# Patient Record
Sex: FEMALE | Race: WHITE | NOT HISPANIC OR LATINO | Employment: OTHER | URBAN - NONMETROPOLITAN AREA
[De-identification: names, ages, dates, MRNs, and addresses within clinical notes are randomized per-mention and may not be internally consistent; named-entity substitution may affect disease eponyms.]

---

## 2023-05-22 ENCOUNTER — APPOINTMENT (OUTPATIENT)
Dept: PRIMARY CARE | Facility: CLINIC | Age: 68
End: 2023-05-22
Payer: MEDICARE

## 2023-05-30 PROBLEM — R30.0 DYSURIA: Status: ACTIVE | Noted: 2023-05-30

## 2023-05-30 PROBLEM — L98.9 SKIN LESION: Status: RESOLVED | Noted: 2023-05-30 | Resolved: 2023-05-30

## 2023-05-30 PROBLEM — G47.00 INSOMNIA: Status: ACTIVE | Noted: 2023-05-30

## 2023-05-30 PROBLEM — B37.31 YEAST INFECTION OF THE VAGINA: Status: ACTIVE | Noted: 2023-05-30

## 2023-05-30 PROBLEM — E78.5 DYSLIPIDEMIA: Status: ACTIVE | Noted: 2023-05-30

## 2023-05-30 PROBLEM — M25.561 RIGHT KNEE PAIN: Status: RESOLVED | Noted: 2023-05-30 | Resolved: 2023-05-30

## 2023-05-30 PROBLEM — R32 INCONTINENCE IN FEMALE: Status: ACTIVE | Noted: 2023-05-30

## 2023-05-30 PROBLEM — R23.2 HOT FLASHES: Status: ACTIVE | Noted: 2023-05-30

## 2023-05-30 PROBLEM — N95.1 POSTMENOPAUSAL DISORDER: Status: ACTIVE | Noted: 2023-05-30

## 2023-05-30 PROBLEM — E03.9 HYPOTHYROIDISM: Status: ACTIVE | Noted: 2023-05-30

## 2023-05-30 PROBLEM — E55.9 VITAMIN D DEFICIENCY: Status: ACTIVE | Noted: 2023-05-30

## 2023-05-30 RX ORDER — IBUPROFEN 800 MG/1
1 TABLET ORAL 3 TIMES DAILY PRN
COMMUNITY
Start: 2022-10-12 | End: 2023-07-27 | Stop reason: SDUPTHER

## 2023-05-30 NOTE — PROGRESS NOTES
Subjective   Patient ID: Lauren Terry is a 68 y.o. female who presents for Follow-up (Pt presents today for a 6 month follow up; both knee pain, pain management shots back in Dec of 2022; would like a referral for GI, no control of bladder/bowls; ).    HPI   I have personally reviewed the OARRS report for LAUREN TERRY. I have considered the risks of abuse, dependence, addiction and diversion.   68yo female here for followup:     Right knee pain: Original injury in [place] 10y ago. Hurt it again about 6 weeks ago, no specific injury. Never had surgery, only had conservative therapy back when she was treated a decade ago. She went to PT at [facility] with no improvement. 6 weeks. She is using bengay, icy hot, and nsaids. She just retired and moved here 2y ago, she has a lot of projects that she wants to do this summer. Grew up in [place].   She followed up with pain management. Got injections in [place]. In December. Pain is happening again. She is interested in Ortho for meniscus repair.      Incontinence: She has had issues with urinary and bowel incontinence. She recently had C-scope with Dr. Coughlin. She has had cystocele repair x3, the first was after a 10lb baby (age 24), then another 12y later and then the 3rd 12y after that, most recent was in 2008.   Interested in surgery again  ANP  Gallbladder removed in 2015/2016, started loosing control after this. Taking probiotics, tried elimination diet. Happens 2-3 times per month.      Feet tingling, numbness: Going on for 6 or 8 months. Mostly on the soles of her feet. No pain. She does have a sister with neuropathy.      Hypothyroidism: off meds for about 4 months, completed labs last month.      Insomnia: She has melatonin, she also has ambien that she takes occasionally. A lot of her sleep issues are related to her urinary issues.      All other systems reviewed and negative for complaint unless stated above.     Review of Systems   Constitutional:  Negative for  "chills and fever.   HENT:  Negative for congestion, ear pain and rhinorrhea.    Eyes:  Negative for discharge and redness.   Respiratory:  Negative for cough, shortness of breath and wheezing.    Cardiovascular:  Negative for chest pain and leg swelling.   Gastrointestinal:  Negative for abdominal pain, constipation, diarrhea, nausea and vomiting.   Genitourinary:  Negative for difficulty urinating, frequency and urgency.   Musculoskeletal:  Negative for gait problem.   Skin:  Negative for rash and wound.   Neurological:  Negative for dizziness, weakness and headaches.   Psychiatric/Behavioral:  Negative for confusion. The patient is not nervous/anxious.        Objective   /81 (BP Location: Right arm, Patient Position: Sitting, BP Cuff Size: Large adult)   Pulse 76   Ht 1.6 m (5' 3\")   Wt 84.3 kg (185 lb 12.8 oz)   SpO2 97%   BMI 32.91 kg/m²     Physical Exam  Vitals reviewed.   Constitutional:       Appearance: Normal appearance.   HENT:      Head: Normocephalic.      Right Ear: Tympanic membrane, ear canal and external ear normal.      Left Ear: Tympanic membrane, ear canal and external ear normal.      Nose: No rhinorrhea.      Mouth/Throat:      Mouth: Mucous membranes are moist.      Pharynx: Oropharynx is clear.   Eyes:      Pupils: Pupils are equal, round, and reactive to light.   Cardiovascular:      Rate and Rhythm: Normal rate and regular rhythm.      Pulses: Normal pulses.   Pulmonary:      Effort: Pulmonary effort is normal.      Breath sounds: Normal breath sounds.   Abdominal:      General: Abdomen is flat. Bowel sounds are normal.      Palpations: Abdomen is soft.   Musculoskeletal:         General: No tenderness. Normal range of motion.      Right lower leg: No edema.      Left lower leg: No edema.   Lymphadenopathy:      Cervical: No cervical adenopathy.   Skin:     General: Skin is warm and dry.      Findings: No rash.   Neurological:      Mental Status: She is alert and oriented to " person, place, and time.   Psychiatric:         Mood and Affect: Mood normal.         Behavior: Behavior normal.         Assessment/Plan     #Knee pain   MRI shows large meniscus repair  we discussed that an injection would probably be helpful  referring to ortho today     #Incontinence  discussed PT but she wants to see a surgeon  referring today     #Hot flashes  on premarin, refilling today     #Dyslipidemia   Diet controlled     #Hypothyroidism  doing well off of meds     #Insomnia   has ambien  CSA signed today  mostly sx are related to sleep     #Vitamin D   checking levels today      HCM:  UTD for flu and PNA vaccines  C-scope 2022 with Madyson, next due 2032  Mammogram August 2021 WNL

## 2023-06-01 ENCOUNTER — OFFICE VISIT (OUTPATIENT)
Dept: PRIMARY CARE | Facility: CLINIC | Age: 68
End: 2023-06-01
Payer: MEDICARE

## 2023-06-01 VITALS
HEIGHT: 63 IN | HEART RATE: 76 BPM | OXYGEN SATURATION: 97 % | BODY MASS INDEX: 32.92 KG/M2 | WEIGHT: 185.8 LBS | SYSTOLIC BLOOD PRESSURE: 147 MMHG | DIASTOLIC BLOOD PRESSURE: 81 MMHG

## 2023-06-01 DIAGNOSIS — R15.9 INCONTINENCE OF FECES WITH FECAL URGENCY: ICD-10-CM

## 2023-06-01 DIAGNOSIS — M25.562 CHRONIC PAIN OF BOTH KNEES: Primary | ICD-10-CM

## 2023-06-01 DIAGNOSIS — B49 FUNGAL INFECTION: ICD-10-CM
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
## 2023-06-01 DIAGNOSIS — R32 INCONTINENCE IN FEMALE: ICD-10-CM

## 2023-06-01 DIAGNOSIS — G89.29 CHRONIC PAIN OF BOTH KNEES: Primary | ICD-10-CM

## 2023-06-01 DIAGNOSIS — M25.561 CHRONIC PAIN OF BOTH KNEES: Primary | ICD-10-CM

## 2023-06-01 DIAGNOSIS — Z87.828 HISTORY OF MENISCAL TEAR: ICD-10-CM

## 2023-06-01 DIAGNOSIS — R15.2 INCONTINENCE OF FECES WITH FECAL URGENCY: ICD-10-CM

## 2023-06-01 PROCEDURE — 1036F TOBACCO NON-USER: CPT | Performed by: REGISTERED NURSE

## 2023-06-01 PROCEDURE — 1159F MED LIST DOCD IN RCRD: CPT | Performed by: REGISTERED NURSE

## 2023-06-01 PROCEDURE — 1160F RVW MEDS BY RX/DR IN RCRD: CPT | Performed by: REGISTERED NURSE

## 2023-06-01 PROCEDURE — 99214 OFFICE O/P EST MOD 30 MIN: CPT | Performed by: REGISTERED NURSE

## 2023-06-01 RX ORDER — HYDROCORTISONE 25 MG/G
CREAM TOPICAL 2 TIMES DAILY PRN
Qty: 30 G | Refills: 2 | Status: SHIPPED | OUTPATIENT
Start: 2023-06-01 | End: 2023-11-20 | Stop reason: ALTCHOICE

## 2023-06-01 RX ORDER — ESTROGENS, CONJUGATED 0.45 MG/1
0.45 TABLET, FILM COATED ORAL DAILY
COMMUNITY
Start: 2023-03-16 | End: 2023-10-31 | Stop reason: SDUPTHER

## 2023-06-01 RX ORDER — HYDROXYCHLOROQUINE SULFATE 200 MG/1
TABLET, FILM COATED ORAL
COMMUNITY
Start: 2023-04-25 | End: 2023-06-01 | Stop reason: WASHOUT

## 2023-06-01 RX ORDER — ZOLPIDEM TARTRATE 5 MG/1
5 TABLET ORAL NIGHTLY PRN
COMMUNITY
Start: 2022-12-16 | End: 2023-06-01 | Stop reason: WASHOUT

## 2023-06-01 RX ORDER — BUDESONIDE 0.5 MG/2ML
INHALANT ORAL
COMMUNITY
Start: 2023-04-25 | End: 2023-06-01 | Stop reason: WASHOUT

## 2023-06-01 RX ORDER — TERBINAFINE HYDROCHLORIDE 250 MG/1
250 TABLET ORAL DAILY
Qty: 30 TABLET | Refills: 0 | Status: SHIPPED | OUTPATIENT
Start: 2023-06-01 | End: 2023-07-05 | Stop reason: SDUPTHER

## 2023-06-01 ASSESSMENT — ENCOUNTER SYMPTOMS
CHILLS: 0
WOUND: 0
CONFUSION: 0
WEAKNESS: 0
NERVOUS/ANXIOUS: 0
WHEEZING: 0
CONSTIPATION: 0
FEVER: 0
EYE REDNESS: 0
DIZZINESS: 0
COUGH: 0
HEADACHES: 0
VOMITING: 0
EYE DISCHARGE: 0
ABDOMINAL PAIN: 0
FREQUENCY: 0
DIFFICULTY URINATING: 0
NAUSEA: 0
DIARRHEA: 0
RHINORRHEA: 0
SHORTNESS OF BREATH: 0

## 2023-06-01 NOTE — PATIENT INSTRUCTIONS
Orthopedics:   [facility] [phone]  [facility]: [phone]  -Dr. Ochoa-any  -Dr. Irizarry- spine  -Dr. Eckert- upper extremitys, hand    Gastroenterology:  Marcus Coughlin ([facility]) [phone]  Marcus Valentine ([facility]) [phone]  Romie Peña ([facility]) [phone]  Janene Nelson () [phone]     Urology:   [facility] [phone]  Mrita Le () [phone]  Markel Franco ([facility]) [phone]  Markel Mascorro ([facility]) [phone]

## 2023-07-05 DIAGNOSIS — B49 FUNGAL INFECTION: ICD-10-CM

## 2023-07-05 PROBLEM — T63.461A WASP STING: Status: ACTIVE | Noted: 2018-07-19

## 2023-07-05 PROBLEM — L03.119 CELLULITIS OF WRIST: Status: ACTIVE | Noted: 2018-07-19

## 2023-07-05 RX ORDER — TERBINAFINE HYDROCHLORIDE 250 MG/1
250 TABLET ORAL DAILY
Qty: 30 TABLET | Refills: 2 | Status: SHIPPED | OUTPATIENT
Start: 2023-07-05 | End: 2023-10-03

## 2023-07-27 DIAGNOSIS — M25.562 CHRONIC PAIN OF LEFT KNEE: ICD-10-CM

## 2023-07-27 DIAGNOSIS — R52 MILD PAIN: ICD-10-CM

## 2023-07-27 DIAGNOSIS — G89.29 CHRONIC PAIN OF LEFT KNEE: ICD-10-CM

## 2023-07-27 PROBLEM — S83.241A ACUTE MEDIAL MENISCUS TEAR OF RIGHT KNEE: Status: ACTIVE | Noted: 2023-07-27

## 2023-07-27 RX ORDER — IBUPROFEN 800 MG/1
800 TABLET ORAL 3 TIMES DAILY PRN
Qty: 30 TABLET | Refills: 0 | Status: SHIPPED | OUTPATIENT
Start: 2023-07-27 | End: 2023-09-27 | Stop reason: SDUPTHER

## 2023-07-27 RX ORDER — TRAMADOL HYDROCHLORIDE 50 MG/1
50 TABLET ORAL EVERY 6 HOURS PRN
COMMUNITY
Start: 2023-07-17 | End: 2023-11-20 | Stop reason: ALTCHOICE

## 2023-08-08 ENCOUNTER — TELEPHONE (OUTPATIENT)
Dept: PRIMARY CARE | Facility: CLINIC | Age: 68
End: 2023-08-08
Payer: MEDICARE

## 2023-08-08 DIAGNOSIS — M25.562 LEFT KNEE PAIN, UNSPECIFIED CHRONICITY: Primary | ICD-10-CM

## 2023-08-08 RX ORDER — IBUPROFEN 600 MG/1
600 TABLET ORAL 3 TIMES DAILY PRN
Qty: 60 TABLET | Refills: 0 | Status: SHIPPED | OUTPATIENT
Start: 2023-08-08 | End: 2023-10-07

## 2023-08-08 RX ORDER — LIDOCAINE 50 MG/G
1 PATCH TOPICAL DAILY
Qty: 30 PATCH | Refills: 11 | Status: SHIPPED | OUTPATIENT
Start: 2023-08-08 | End: 2023-11-20 | Stop reason: ALTCHOICE

## 2023-08-08 NOTE — TELEPHONE ENCOUNTER
"Maci called and stated she had knee surgery on the 17th, she has had two follow up apt with the surgeon since, one was today. She was given a shot of pain medication.  She is wondering if she can get a RX for motrin and lidocaine patches for pain? She stats that she has been \"living off of motrin but needs more\" and that her sister gave her some patches and they seem to help with the pain.  "

## 2023-09-17 LAB — URINE CULTURE: ABNORMAL

## 2023-09-27 DIAGNOSIS — M25.562 CHRONIC PAIN OF LEFT KNEE: ICD-10-CM

## 2023-09-27 DIAGNOSIS — R52 MILD PAIN: ICD-10-CM

## 2023-09-27 DIAGNOSIS — G89.29 CHRONIC PAIN OF LEFT KNEE: ICD-10-CM

## 2023-09-27 RX ORDER — IBUPROFEN 800 MG/1
800 TABLET ORAL 3 TIMES DAILY PRN
Qty: 180 TABLET | Refills: 1 | Status: ON HOLD | OUTPATIENT
Start: 2023-09-27 | End: 2024-03-29 | Stop reason: SDUPTHER

## 2023-10-06 ENCOUNTER — LAB REQUISITION (OUTPATIENT)
Dept: LAB | Facility: HOSPITAL | Age: 68
End: 2023-10-06
Payer: COMMERCIAL

## 2023-10-06 DIAGNOSIS — N39.0 URINARY TRACT INFECTION, SITE NOT SPECIFIED: ICD-10-CM

## 2023-10-06 PROCEDURE — 87086 URINE CULTURE/COLONY COUNT: CPT

## 2023-10-06 PROCEDURE — 87186 SC STD MICRODIL/AGAR DIL: CPT

## 2023-10-09 LAB — BACTERIA UR CULT: ABNORMAL

## 2023-10-12 ENCOUNTER — APPOINTMENT (OUTPATIENT)
Dept: ORTHOPEDIC SURGERY | Facility: CLINIC | Age: 68
End: 2023-10-12
Payer: MEDICARE

## 2023-10-31 DIAGNOSIS — N95.1 POSTMENOPAUSAL DISORDER: Primary | ICD-10-CM

## 2023-11-09 ENCOUNTER — OFFICE VISIT (OUTPATIENT)
Dept: ORTHOPEDIC SURGERY | Facility: CLINIC | Age: 68
End: 2023-11-09
Payer: MEDICARE

## 2023-11-09 VITALS — HEIGHT: 63 IN | BODY MASS INDEX: 30.65 KG/M2 | WEIGHT: 173 LBS

## 2023-11-09 DIAGNOSIS — Z98.890 STATUS POST ARTHROSCOPY OF RIGHT KNEE: Primary | ICD-10-CM

## 2023-11-09 PROCEDURE — 99213 OFFICE O/P EST LOW 20 MIN: CPT | Performed by: ORTHOPAEDIC SURGERY

## 2023-11-09 PROCEDURE — 1036F TOBACCO NON-USER: CPT | Performed by: ORTHOPAEDIC SURGERY

## 2023-11-09 PROCEDURE — 1159F MED LIST DOCD IN RCRD: CPT | Performed by: ORTHOPAEDIC SURGERY

## 2023-11-09 PROCEDURE — 1160F RVW MEDS BY RX/DR IN RCRD: CPT | Performed by: ORTHOPAEDIC SURGERY

## 2023-11-09 ASSESSMENT — PAIN - FUNCTIONAL ASSESSMENT: PAIN_FUNCTIONAL_ASSESSMENT: NO/DENIES PAIN

## 2023-11-09 NOTE — PROGRESS NOTES
Subjective    Patient ID: Lauren Terry is a 68 y.o. female.    Chief Complaint: Follow-up of the Right Knee (S/P SCOPE )     Last Surgery: No surgery found  Last Surgery Date: No surgery found    HPI doing much better the Euflexxa shots have helped her    Objective   Ortho Exam range of motion 0 to 110 degrees good stability no effusion    Image Results:  XR knee complete 4 or more views  Narrative: Interpreted By:  ALEXX CUMMINGS MD  MRN: [id number]  Patient Name: LAUREN TERRY     STUDY:  KNEE; COMPLT, 4 OR MORE VIEWS;  9/14/2023 8:36 am     INDICATION:  PAIN  M25.569: Knee pain.     COMPARISON:  Right knee series of 10/12/2022.     ACCESSION NUMBER(S):  [id number]     ORDERING CLINICIAN:  ANDERSON VEGA     TECHNIQUE:  Right knee series performed with 4 images. AP view, tunnel view and  sunrise view includes both knees.     FINDINGS:  Right knee alignment is intact. Right knee mild joint space narrowing  is greatest in the medial compartment. On lateral projection of the  right knee there is a small rounded lucency adjacent to the articular  surface of the patella which could represent a small subchondral cyst  or osteochondral injury. No acute fracture or subluxation is seen. No  definite joint effusion. Included views of the left knee show mild  joint space narrowing of the left knee as well.     Impression: Mild joint space narrowing of the right knee greatest in the medial  compartment.     Right knee small round lucency adjacent to the articular surface of  the patella could represent a small subchondral cyst or osteochondral  injury.      Assessment/Plan so at this point should continue doing her exercises continue ice and we will see her as needed  Encounter Diagnoses:  Status post arthroscopy of right knee    No orders of the defined types were placed in this encounter.    No follow-ups on file.

## 2023-11-14 PROBLEM — M70.51 PES ANSERINUS BURSITIS OF RIGHT KNEE: Status: ACTIVE | Noted: 2023-11-14

## 2023-11-14 PROBLEM — B37.31 YEAST INFECTION OF THE VAGINA: Status: RESOLVED | Noted: 2023-05-30 | Resolved: 2023-11-14

## 2023-11-14 PROBLEM — L03.119 CELLULITIS OF WRIST: Status: RESOLVED | Noted: 2018-07-19 | Resolved: 2023-11-14

## 2023-11-14 PROBLEM — M17.11 ARTHRITIS OF KNEE, RIGHT: Status: ACTIVE | Noted: 2023-11-14

## 2023-11-14 PROBLEM — E66.9 OBESITY WITH BODY MASS INDEX 30 OR GREATER: Status: ACTIVE | Noted: 2023-11-14

## 2023-11-14 PROBLEM — M25.562 LEFT KNEE PAIN: Status: RESOLVED | Noted: 2023-07-27 | Resolved: 2023-11-14

## 2023-11-14 PROBLEM — T63.461A WASP STING: Status: RESOLVED | Noted: 2018-07-19 | Resolved: 2023-11-14

## 2023-11-14 PROBLEM — R30.0 DYSURIA: Status: RESOLVED | Noted: 2023-05-30 | Resolved: 2023-11-14

## 2023-11-14 PROBLEM — S83.241A ACUTE MEDIAL MENISCUS TEAR OF RIGHT KNEE: Status: RESOLVED | Noted: 2023-07-27 | Resolved: 2023-11-14

## 2023-11-14 NOTE — PROGRESS NOTES
"Maci Terry is a 68 y.o. female who presents for Follow-up (Would like a full panel of bloodwork for some issues she's having. )     Recurrent UTI: She has had 2 uti's this fall. She has had 3 cystocele and recotcele repairs. She is interested in seeing a surgeon for another attempt at surgery.     Right knee pain: Had right knee meniscus repair in July. Had severe pain after the surgery. Has been to PT with [facility]. She spoke to Dr. Allison about this knot that appeared in the lateral aspect of her quadriceps. She had a few injections of hyaluronic acid. She is walking. But now she is also dealing with pain in the back of the knee. Feels almost like a pulled hamstring. She has been icing it and staying off of it.      Feet tingling, numbness: Going on for 6 or 8 months. Mostly on the soles of her feet. No pain. She does have a sister with neuropathy.      Hypothyroidism: off meds for about 4 months, completed labs last month.      Insomnia: She has melatonin, she also has ambien that she takes occasionally. A lot of her sleep issues are related to her urinary issues. She does get some amnesia from the ambien so she doesn't take it much. She hasn't tried trazodone for this.       All other systems reviewed and negative for complaint unless stated above.    Objective   /84 (BP Location: Left arm)   Pulse 76   Ht 1.6 m (5' 3\")   Wt 82.5 kg (181 lb 12.8 oz)   BMI 32.20 kg/m²     Gen: No acute distress, alert and oriented x3, pleasant   HEENT: moist mucous membranes, b/l external auditory canals are clear of debris, TMs within normal limits, no oropharyngeal lesions, eomi, perrla   Neck: thyroid within normal limits, no lymphadenopathy   CV: RRR, normal S1/S2, no murmur   Resp: Clear to auscultation bilaterally, no wheezes or rhonchi appreciated  Abd: soft, nontender, non-distended, no guarding/rigidity, bowel sounds present  Extr: no edema, no calf tenderness  Derm: Skin is warm and dry, no rashes " appreciated  Psych: mood is good, affect is congruent, good hygiene, normal speech and eye contact  Neuro: cranial nerves grossly intact, normal gait  MSK: Right knee with no appreciable effusion, diffuse moderate TTP, there is prominence and swelling of the lateral aspect of distal quadriceps musculature    Assessment/Plan     #Knee pain   S/p meniscus repair with Dr. Allison    #Leg mass  Check MSK ultrasound     #Incontinence  discussed PT but she wants to see a surgeon  Referring again today     #Hot flashes  Doing well on premarin     #Dyslipidemia   Diet controlled     #Hypothyroidism  doing well off of meds     #Insomnia   has ambien, has signed CSA for use  Trial trazodone     #Vitamin D   checking levels today      HCM:  UTD for flu and PNA vaccines  C-scope 2022 with Madyson, next due 2032  Mammogram August 2021 WNL, ordered

## 2023-11-20 ENCOUNTER — OFFICE VISIT (OUTPATIENT)
Dept: PRIMARY CARE | Facility: CLINIC | Age: 68
End: 2023-11-20
Payer: MEDICARE

## 2023-11-20 VITALS
DIASTOLIC BLOOD PRESSURE: 78 MMHG | HEIGHT: 63 IN | HEART RATE: 76 BPM | BODY MASS INDEX: 32.21 KG/M2 | SYSTOLIC BLOOD PRESSURE: 130 MMHG | WEIGHT: 181.8 LBS

## 2023-11-20 DIAGNOSIS — E03.9 HYPOTHYROIDISM, UNSPECIFIED TYPE: Primary | ICD-10-CM

## 2023-11-20 DIAGNOSIS — F51.01 PRIMARY INSOMNIA: ICD-10-CM

## 2023-11-20 DIAGNOSIS — R15.9 INCONTINENCE OF FECES, UNSPECIFIED FECAL INCONTINENCE TYPE: ICD-10-CM

## 2023-11-20 DIAGNOSIS — E55.9 VITAMIN D DEFICIENCY: ICD-10-CM

## 2023-11-20 DIAGNOSIS — Z12.31 SCREENING MAMMOGRAM FOR BREAST CANCER: ICD-10-CM

## 2023-11-20 DIAGNOSIS — Z23 NEED FOR INFLUENZA VACCINATION: ICD-10-CM

## 2023-11-20 DIAGNOSIS — E78.5 DYSLIPIDEMIA: ICD-10-CM

## 2023-11-20 DIAGNOSIS — R22.41 MASS OF RIGHT LOWER EXTREMITY: ICD-10-CM

## 2023-11-20 PROCEDURE — 1160F RVW MEDS BY RX/DR IN RCRD: CPT | Performed by: FAMILY MEDICINE

## 2023-11-20 PROCEDURE — 1036F TOBACCO NON-USER: CPT | Performed by: FAMILY MEDICINE

## 2023-11-20 PROCEDURE — 99214 OFFICE O/P EST MOD 30 MIN: CPT | Performed by: FAMILY MEDICINE

## 2023-11-20 PROCEDURE — 90662 IIV NO PRSV INCREASED AG IM: CPT | Performed by: FAMILY MEDICINE

## 2023-11-20 PROCEDURE — G0008 ADMIN INFLUENZA VIRUS VAC: HCPCS | Performed by: FAMILY MEDICINE

## 2023-11-20 PROCEDURE — 1159F MED LIST DOCD IN RCRD: CPT | Performed by: FAMILY MEDICINE

## 2023-11-20 RX ORDER — TRAZODONE HYDROCHLORIDE 50 MG/1
50 TABLET ORAL NIGHTLY PRN
Qty: 30 TABLET | Refills: 1 | Status: SHIPPED | OUTPATIENT
Start: 2023-11-20 | End: 2024-03-20 | Stop reason: ALTCHOICE

## 2023-11-20 NOTE — PATIENT INSTRUCTIONS
OB/Gynecology  Marcus Jain () [phone]    Radiology:   Central Scheduling:  Radiology: [phone]

## 2023-12-01 ENCOUNTER — APPOINTMENT (OUTPATIENT)
Dept: PRIMARY CARE | Facility: CLINIC | Age: 68
End: 2023-12-01
Payer: MEDICARE

## 2023-12-08 ENCOUNTER — HOSPITAL ENCOUNTER (OUTPATIENT)
Dept: RADIOLOGY | Facility: HOSPITAL | Age: 68
Discharge: HOME | End: 2023-12-08
Payer: MEDICARE

## 2023-12-08 DIAGNOSIS — Z12.31 SCREENING MAMMOGRAM FOR BREAST CANCER: ICD-10-CM

## 2023-12-08 PROCEDURE — 77063 BREAST TOMOSYNTHESIS BI: CPT

## 2023-12-08 PROCEDURE — 77063 BREAST TOMOSYNTHESIS BI: CPT | Mod: BILATERAL PROCEDURE | Performed by: RADIOLOGY

## 2023-12-08 PROCEDURE — 77067 SCR MAMMO BI INCL CAD: CPT | Mod: BILATERAL PROCEDURE | Performed by: RADIOLOGY

## 2023-12-11 ENCOUNTER — ANCILLARY PROCEDURE (OUTPATIENT)
Dept: RADIOLOGY | Facility: HOSPITAL | Age: 68
End: 2023-12-11
Payer: MEDICARE

## 2023-12-11 DIAGNOSIS — R22.41 MASS OF RIGHT LOWER EXTREMITY: ICD-10-CM

## 2023-12-11 PROCEDURE — 76882 US LMTD JT/FCL EVL NVASC XTR: CPT | Performed by: RADIOLOGY

## 2023-12-11 PROCEDURE — 76881 US COMPL JOINT R-T W/IMG: CPT

## 2024-01-09 ENCOUNTER — OFFICE VISIT (OUTPATIENT)
Dept: OBSTETRICS AND GYNECOLOGY | Facility: CLINIC | Age: 69
End: 2024-01-09
Payer: MEDICARE

## 2024-01-09 VITALS
SYSTOLIC BLOOD PRESSURE: 148 MMHG | HEIGHT: 63 IN | DIASTOLIC BLOOD PRESSURE: 84 MMHG | WEIGHT: 184 LBS | BODY MASS INDEX: 32.6 KG/M2

## 2024-01-09 DIAGNOSIS — R33.9 URINARY RETENTION: ICD-10-CM

## 2024-01-09 DIAGNOSIS — N39.3 SUI (STRESS URINARY INCONTINENCE, FEMALE): ICD-10-CM

## 2024-01-09 DIAGNOSIS — N32.81 OAB (OVERACTIVE BLADDER): Primary | ICD-10-CM

## 2024-01-09 DIAGNOSIS — R15.9 INCONTINENCE OF FECES, UNSPECIFIED FECAL INCONTINENCE TYPE: ICD-10-CM

## 2024-01-09 DIAGNOSIS — Z87.440 HISTORY OF UTI: ICD-10-CM

## 2024-01-09 DIAGNOSIS — N81.9 VAGINAL VAULT PROLAPSE: ICD-10-CM

## 2024-01-09 PROCEDURE — 51701 INSERT BLADDER CATHETER: CPT | Performed by: OBSTETRICS & GYNECOLOGY

## 2024-01-09 PROCEDURE — 1160F RVW MEDS BY RX/DR IN RCRD: CPT | Performed by: OBSTETRICS & GYNECOLOGY

## 2024-01-09 PROCEDURE — 1036F TOBACCO NON-USER: CPT | Performed by: OBSTETRICS & GYNECOLOGY

## 2024-01-09 PROCEDURE — 99205 OFFICE O/P NEW HI 60 MIN: CPT | Performed by: OBSTETRICS & GYNECOLOGY

## 2024-01-09 PROCEDURE — 1159F MED LIST DOCD IN RCRD: CPT | Performed by: OBSTETRICS & GYNECOLOGY

## 2024-01-09 ASSESSMENT — ENCOUNTER SYMPTOMS
CONSTITUTIONAL NEGATIVE: 1
EYES NEGATIVE: 1
FREQUENCY: 1
RESPIRATORY NEGATIVE: 1
DIARRHEA: 1
PSYCHIATRIC NEGATIVE: 1
NEUROLOGICAL NEGATIVE: 1
MUSCULOSKELETAL NEGATIVE: 1
CONSTIPATION: 1
CARDIOVASCULAR NEGATIVE: 1
ENDOCRINE NEGATIVE: 1

## 2024-01-09 NOTE — PROGRESS NOTES
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 Comorbidities include: Hypothyroidism.     Diagnoses:  #1 Vaginal vault prolapse, recurrent   #2 Mixed urinary incontinence, urge > stress  #3 Fecal incontinence, fecal urgency, mixed stool consistency    #4 Urinary retention   #5 recurrent uti    Plan:   1. Recurrent vaginal vault prolapse, urinary retention   - PVR = 200mL by s/c.    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 She then had a hysterectomy with another prolapse repair (described as APR) in [facility] in 1995. She then had another subsequent POP repair (described as an APR/suspension) in [place] in 2008 before she went to [facility]. The patient does not recall any of her prolapse repairs including mesh.   - Upon exam her Pop-Q demonstrated Aa: 0, Ap: 0, C: +2, Gh: 5, Pb: 5, and D: -1 consistent with stage 3 vaginal vault prolapse.   - We discussed that her urinary retention could be due to her prolapse vs. non-obstructed urinary retention. If she has non-obstructed urinary retention we discussed that sacral neurostimulation is an approved tx for this specific issue. However, if her retention is due to her prolapse causing obstructive urinary flow/retention then SNM will not help and the treatment would include a prolapse surgical repair.   - Will plan to order UDS to evaluate if the bladder is not functioning properly (non-obstructed retention) vs. POP causing retention.   - Reviewed non-surgical intervention of POP such as fitting her with a pessary to help manage the vaginal bulge and empty her bladder. We discussed that we would provide her with education on how to manage her pessary at home. We reviewed that the pessary has to be taken out at least once every 3  months to prevent erosion into other organs and if the pessary is left in over 3-4 months it may cause vaginal bleeding/malodorous discharge.   - Discussed different POP repair options to consider such as the SSLS with APR vs. SCP.   - Gave her PI handout on SSLS and SCP to review and consider. She is primarily interested in a POP repair vs. pessary.    2. OAB, UUI  - Discussed OAB treatment options such as lifestyle changes, PFPT, medications, PTNS, intradetrusor Botox injections, or SNM.   - We discussed OAB lifestyle changes (i.e., trying to limit fluids to 60oz in total per day, timed voiding every 2-3 hours, stop drinking fluids 3 hours prior to bedtime, and avoiding/limiting bladder irritants such as caffeine, nicotine, artificial sweeteners, acidic/spicy foods, and alcohol).   - We discussed that PFPT may help with bladder/pelvic floor restrengthening exercises with an overall goal to better control the bladder and improve urinary symptoms. PFPT includes attending sessions with a female licensed and specialized pelvic floor physical therapist who does external and internal vaginal work to ensure she is doing the correct exercises to obtain the most benefit of physical therapy. We reviewed the importance of continuing these home exercises to receive maximum benefit from PFPT. They also teach mind over bladder strategies/urge suppression techniques to reduce the intensity of the urge to void.   - We discussed that with starting an OAB medication the goal would be to allow the detrusor muscle around the bladder to relax and prevent the muscles from spasm/squeezing too frequently to allow the bladder to store more urine which reduces the urge, frequency, and incontinent episodes.   - Discussed third-line OAB treatment options and discussed the InterStim in detail as we believe she would benefit from this to address both her UUI and FI.   - She is considering SNM and we gave her PI handout to review/consider.      3. FI, fecal urgency, mixed stool consistency  - The patient has FI with formed stools around 2-3x per month.   - Upon exam she had minimal rectal squeeze 1 out of 5 and a + Dovetail sign likely from her prior fourth degree OASI that did not heal correctly.    - We dicussed treatment options to consider such as PFPT to help strengthen anal sphincter muscle control/coordination of this muscle, sphincteroplasty, or SNM.   - Discussed optimizing a daily fiber supplement (I.e. Psyllium fiber, Benefiber, and Metamucil) to help bulk the stools to prevent constipation and diarrhea. Of note, she has been taking Benefiber and we encouraged her to continue this daily.   - Counseled that the anal sphincter muscle has been  for over 46 years and data shows that a sphincteroplasty has a 50% chance of improving her bowel control at 5 years. We discussed that even with loss of the anal sphincter muscle control that the puborectalis muscle typically works to provide bowel control, however, with aging and loss of muscle mass over time this secondary muscle is no longer providing her with adequate bowel control. The sphincteroplasty has a risk of infection and has a painful recovery due to anal spasms.   - We discussed consideration of pursuing sacral neuromodulation (SNM) for management of her OAB/FI. Reviewed the procedure for sacral neuromodulation and discussed it is a staged procedure which allows us to determine degree of symptom improvement during the PNE trial prior to implanting permanent device. She will keep a bladder/bowel diary to keep track of her symptoms to determine if she has had 50% or greater in her symptom improvement. Reviewed that if symptoms are at least 50% improved during the trial period we would implant the permanent device in the OR under sedation.  - Gave her PI handout on SNM to review and consider.     4. RADHA  - We discussed that in order to correctly treat her urinary issues she will need  UDS to fully assess/diagnose the type of urinary problems she is having (i.e., RADHA vs. ISD vs. OAB). Urodynamics assesses bladder function and capacity, voiding/filling patterns, urethral strength, assess if there is PEDROZA, MUCP, etc.   - Counseled on the importance of UDS to evaluate if there will be occult RADHA after POP repair and if there is we would offer her an anti-incontinence procedure at the time of her POP repair (i.e. midurethral sling).    5. Recurrent uti  - will discuss at next visit. Consider vaginal estrogen    Follow up in 1-2 weeks with Dr. Ana Morales to review UDS and discuss treatment moving forward.     Scribe Attestation  By signing my name below, I, Sarika Astorga, attest that this documentation has been prepared under the direction and in the presence of Ana Morales MD MPH on 01/09/2024 at 4:43 PM.     Problem List Items Addressed This Visit    None  Visit Diagnoses       OAB (overactive bladder)    -  Primary    Relevant Orders    Uroflowmetry    Incontinence of feces, unspecified fecal incontinence type        Urinary retention        Relevant Orders    Uroflowmetry    Vaginal vault prolapse        History of UTI        RADHA (stress urinary incontinence, female)        Relevant Orders    Uroflowmetry           I spent a total of 60 minutes in face to face and non face to face time.      Ana Morales MD, MPH, FACOG     New    HISTORY OF PRESENT ILLNESS:   68 year old female presenting as a new patient referral from Dr. Josee Cason (her PCP) for evaluation of bladder prolapse. She complains of bowel and bladder incontinence that has been ongoing for several years.     Records Review:  - 10/6/2023 Urine culture: Klebsiella pneumonia/variicola >100,000 CFU/ML. Resistant to Ampicillin.   - 9/14/2023 Urine culture: Klebsiella pneumonia >100,000 CFU/ML. Resistant to Ampicillin.     Prolapse Symptoms:  - Prior cystocele repair/MMK performed while she was in active duty  in  "[place] in 1981. She then had a hysterectomy with another prolapse repair (described as APR) in [facility] in 1995. She then had another subsequent POP repair (described as an APR/suspension) in [place] in 2008 before she went to [facility]. The patient does not recall any of her prolapse repairs including mesh.   - Denies feeling a vaginal bulge.      Urinary Symptoms:   - She reports experiencing complete episodes of urinary incontinence. The patient does not wear pads or Depends because these do not catch all the urine due to full saturation.   - Her bladder incontinence is very stressful for her to where she travels with a bucket in her car in case she has to urinate.   - Patient is extremely bothered by her UUI.   - She only endorsed RADHA during prior pregnancies, not a problem at baseline. However, she reports having RADHA when she was in the [employer] when she would run, do sit ups, movement, etc.   - Voids around 10x per day. She does not feel that she fully empties her bladder after urinating.   - Reports hx of 2-3 UTI within the last 6 months. She states her hx of UTI she believes was correlated with sexual intercourse in the past when she was  as she had frequent UTIs then and with dehydration. Her UTI sxs include strangury and urinary hesitancy; takes Azo and antibiotics with symptom improvement.   - Nocturia 4-5x per night.   - Does not drink alcohol or caffeine beverages.   - Reports a long time ago taking Imipramine in the 1970s for RADHA.     Bowel Symptoms:   - She reports experiencing varying stool consistency between constipation and diarrhea.    - Has a BM every 2-3 days at baseline.   - She has been taking supplements called \"Silvia\" twice daily and has failed stool softeners in the past to help manage constipation.   - Endorses fecal incontinence with formed stools.   - FI occurs with severe fecal urgency around 2-3x per month. She feels that she has no anal sphincter control.     OBGYN History:  - " ", x3   - Hx of fourth degree OASI with her first delivery.   - Weight of largest baby: 9#2oz  - Patient started taking po Premarin in  prescribed by a provider in [place] for vasomotor symptoms of perimenopause.     Sexual Activity:   - Not currently sexually active due to lack of partner/interest. She wishes to maintain the possibility of being sexually active in the future.        Past Medical History:     Past Medical History:   Diagnosis Date    Right knee pain 2023    Skin lesion 2023       Past Surgical History:     Past Surgical History:   Procedure Laterality Date    OTHER SURGICAL HISTORY  2019    Hysterectomy    OTHER SURGICAL HISTORY  2019    Cholecystectomy       Medications:     Prior to Admission medications    Medication Sig Start Date End Date Taking? Authorizing Provider   estrogens, conjugated, (Premarin) 0.45 mg tablet Take 1 tablet (0.45 mg) by mouth once daily. 10/31/23   Josee Cason DO   ibuprofen 800 mg tablet Take 1 tablet (800 mg) by mouth 3 times a day as needed for moderate pain (4 - 6). 23   Josee Cason DO   traZODone (Desyrel) 50 mg tablet Take 1 tablet (50 mg) by mouth as needed at bedtime for sleep. 23  Josee Cason DO        ROS  Review of Systems   Constitutional: Negative.    HENT: Negative.     Eyes: Negative.    Respiratory: Negative.     Cardiovascular: Negative.    Gastrointestinal:  Positive for constipation and diarrhea.   Endocrine: Negative.    Genitourinary:  Positive for enuresis, frequency and urgency.   Musculoskeletal: Negative.    Neurological: Negative.    Psychiatric/Behavioral: Negative.            PHYSICAL EXAM:    /84   Ht 1.6 m (5' 3\")   Wt 83.5 kg (184 lb)   BMI 32.59 kg/m²     PVR = 200mL by straight catheterization.        Declines chaperone for physical exam.      Well developed, well nourished, in no apparent distress.   Neurologic/Psychiatric:  Awake, Alert and Oriented " times 3.  Affect normal.     GENITAL/URINARY:     External Genitalia:  The patient has normal appearing external genitalia, normal skenes and bartholins glands, and a normal hair distribution.  Her vulva is without lesions, erythema or discharge.  It is non-tender with appropriate sensation. Normal sensation over the bilateral labia majora and decreased sensation over the bilateral buttocks. Absent bulbocavernosus and anal wink reflexes.     Urethral Meatus:  Size normal, Location normal, Lesions absent, Prolapse absent.     Urethra:  Fullness absent, Masses absent. Negative CST in the supine position with reduction of prolapse.     Bladder:  Fullness absent, Masses absent, Tenderness absent.     Vagina:  General appearance normal, Estrogen effect normal, Discharge absent, Lesions absent. Hypoestrogenic vaginal epithelium.     Cervix: surgically absent  Uterus:  surgically absent    Anus/Perineum: Normal perineum. Positive dovetail sign.     Stress urinary incontinence not demonstrable.       Physical Exam  Genitourinary:      Cervix is absent.      Uterus is absent.      No urethral stress urinary incontinence with cough stress test present.      Anal wink absent and BC reflex absent.   POP-Q measurements were:      Aa: 0, Ba: 0, C: +2     gH: 5, pB: 5, TVL: 10     Ap: 0, Bp: 0, D: -1        Rectal exam: + Dovetail sign. Normal resting tone. Minimal squeeze, 1/5.        Data and DIAGNOSTIC STUDIES REVIEWED   US MSK lower extremity joints tendons muscles    Result Date: 12/11/2023  Interpreted By:  Zach Easley, STUDY: Musculoskeletal ultrasound of the right knee dated  12/11/2023.   INDICATION: Lump lateral site of quadriceps with induration. Meniscus surgery.   COMPARISON: None.   ACCESSION NUMBER(S): [id number]   ORDERING CLINICIAN: GLEN STOKES   TECHNIQUE: Multiplanar color and grayscale ultrasound of the right knee was performed.   FINDINGS: Photograph demonstrates contour deformity to the  anterolateral right knee skin/soft tissues. Ultrasound scanning of this region demonstrates skin underlying subcutaneous tissues and musculotendinous structures. No mass is evident. Quadriceps tendon is intact. No knee joint effusion is evident. Patellar tendon is intact. No fluid is evident in the prepatellar bursa or the deep or superficial infrapatellar bursa.       No definitive finding is evident on ultrasound to correlate with the patient's clinical finding. If there remains persistent concern for pathology, MRI can be considered.   Signed by: Zach Easley 12/11/2023 12:21 PM Dictation workstation:   [id number]     Lab Results   Component Value Date    URINECULTURE >100,000 Klebsiella pneumoniae/variicola (A) 10/06/2023      Lab Results   Component Value Date    GLUCOSE 101 (H) 07/13/2023    CALCIUM 8.6 07/13/2023     07/13/2023    K 4.4 07/13/2023    CO2 24 07/13/2023     07/13/2023    BUN 11 07/13/2023    CREATININE 0.8 07/13/2023     Lab Results   Component Value Date    WBC 5.5 07/13/2023    HGB 12.7 07/13/2023    HCT 38.4 07/13/2023    MCV 87.5 07/13/2023     07/13/2023

## 2024-01-10 NOTE — PATIENT INSTRUCTIONS
We discussed lifestyle changes includin) Aiming to drink around 60 oz total of fluids per day   2) Avoiding bladder irritants such as caffeine, nicotine, artificial sweeteners   3) Stop drinking fluids 3 hours before bedtime   4) Timed voids every 2-3 hours.

## 2024-01-23 ENCOUNTER — APPOINTMENT (OUTPATIENT)
Dept: OBSTETRICS AND GYNECOLOGY | Facility: CLINIC | Age: 69
End: 2024-01-23
Payer: MEDICARE

## 2024-01-24 ENCOUNTER — PROCEDURE VISIT (OUTPATIENT)
Dept: OBSTETRICS AND GYNECOLOGY | Facility: CLINIC | Age: 69
End: 2024-01-24
Payer: MEDICARE

## 2024-01-24 DIAGNOSIS — N39.41 URGE INCONTINENCE OF URINE: ICD-10-CM

## 2024-01-24 DIAGNOSIS — N30.00 ACUTE CYSTITIS WITHOUT HEMATURIA: Primary | ICD-10-CM

## 2024-01-24 PROCEDURE — 87086 URINE CULTURE/COLONY COUNT: CPT | Performed by: OBSTETRICS & GYNECOLOGY

## 2024-01-26 ENCOUNTER — TELEPHONE (OUTPATIENT)
Dept: OBSTETRICS AND GYNECOLOGY | Facility: CLINIC | Age: 69
End: 2024-01-26
Payer: MEDICARE

## 2024-01-26 LAB — BACTERIA UR CULT: ABNORMAL

## 2024-01-26 RX ORDER — SULFAMETHOXAZOLE AND TRIMETHOPRIM 800; 160 MG/1; MG/1
1 TABLET ORAL 2 TIMES DAILY
Qty: 10 TABLET | Refills: 0 | Status: SHIPPED | OUTPATIENT
Start: 2024-01-26 | End: 2024-01-31

## 2024-01-26 NOTE — TELEPHONE ENCOUNTER
----- Message from Ana Morales MD MPH sent at 1/26/2024  4:29 PM EST -----  Rx for bactrim sent to her pharmacy. Please let her know. Thanks!

## 2024-01-26 NOTE — TELEPHONE ENCOUNTER
Maci Terry informed of urine culture results and Antibiotic: Bactrim sent to local pharmacy per Dr. Morales.

## 2024-01-29 ENCOUNTER — TELEPHONE (OUTPATIENT)
Dept: OBSTETRICS AND GYNECOLOGY | Facility: CLINIC | Age: 69
End: 2024-01-29
Payer: MEDICARE

## 2024-01-30 ENCOUNTER — APPOINTMENT (OUTPATIENT)
Dept: OBSTETRICS AND GYNECOLOGY | Facility: CLINIC | Age: 69
End: 2024-01-30
Payer: MEDICARE

## 2024-01-30 NOTE — TELEPHONE ENCOUNTER
Pt took a full course of cipro to treat her UTI, so she doesn't need to take th Bactrim at this time.   Assisted pt to schedule UDS for 2/7/24

## 2024-02-07 ENCOUNTER — PROCEDURE VISIT (OUTPATIENT)
Dept: OBSTETRICS AND GYNECOLOGY | Facility: CLINIC | Age: 69
End: 2024-02-07
Payer: MEDICARE

## 2024-02-07 DIAGNOSIS — R33.9 URINARY RETENTION: ICD-10-CM

## 2024-02-07 PROCEDURE — 51741 ELECTRO-UROFLOWMETRY FIRST: CPT | Performed by: OBSTETRICS & GYNECOLOGY

## 2024-02-07 PROCEDURE — 51784 ANAL/URINARY MUSCLE STUDY: CPT | Performed by: OBSTETRICS & GYNECOLOGY

## 2024-02-07 PROCEDURE — 51729 CYSTOMETROGRAM W/VP&UP: CPT | Performed by: OBSTETRICS & GYNECOLOGY

## 2024-02-07 PROCEDURE — 51797 INTRAABDOMINAL PRESSURE TEST: CPT | Performed by: OBSTETRICS & GYNECOLOGY

## 2024-02-08 NOTE — PROGRESS NOTES
Patient ID: Maci Terry is a 68 y.o. female.    Uroflowmetry    Date/Time: 2/7/2024 9:21 AM    Performed by: Marily Alfaro MA  Authorized by: Ana Morales MD MPH    Procedure discussed: discussed risks, benefits and alternatives    Chaperone present: no    Timeout: timeout called immediately prior to procedure    Position: sitting    Procedure Details     Procedure: CMG with UPP/voiding pressures, EMG and intra-abdominal voiding study      CMG with UPP/Voiding Pressures Details:     First urge to void (mL): 328    Urgency to void (mL): 407    Bladder capacity (mL): 494    Post-Procedure Details     Outcome: patient tolerated procedure well with no complications      Additional Details      No uroflow was done patient was cathed for 225cc prior to test. No stress noted. Her PVR post testing was 224 ml.           Urodynamics Evaluation Documentation      HPI/Indication for UDS: 68 year old female with FI, OAB/UUI, recurrent vaginal vault prolapse s/p x3 previous vaginal repairs, and vaginal atrophy. Comorbidities include: Hypothyroidism.      Diagnoses:  #1 Vaginal vault prolapse, recurrent   #2 Mixed urinary incontinence, urge > stress  #3 Fecal incontinence, fecal urgency, mixed stool consistency    #4 Urinary retention   #5 recurrent uti     Plan:   1. Recurrent vaginal vault prolapse, urinary retention   - PVR = 200mL by s/c.    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 She then had a hysterectomy with another prolapse repair (described as APR) in [facility] in 1995. She then had another subsequent POP repair (described as an APR/suspension) in [place] in 2008 before she went to [facility]. The patient does not recall any of her prolapse repairs including mesh.   - Upon exam her Pop-Q demonstrated Aa: 0, Ap: 0, C: +2, Gh: 5, Pb: 5, and D: -1 consistent with stage 3 vaginal vault prolapse.   - We discussed that her urinary retention could be due to her prolapse  vs. non-obstructed urinary retention. If she has non-obstructed urinary retention we discussed that sacral neurostimulation is an approved tx for this specific issue. However, if her retention is due to her prolapse causing obstructive urinary flow/retention then SNM will not help and the treatment would include a prolapse surgical repair.   - Will plan to order UDS to evaluate if the bladder is not functioning properly (non-obstructed retention) vs. POP causing retention.   - Reviewed non-surgical intervention of POP such as fitting her with a pessary to help manage the vaginal bulge and empty her bladder. We discussed that we would provide her with education on how to manage her pessary at home. We reviewed that the pessary has to be taken out at least once every 3 months to prevent erosion into other organs and if the pessary is left in over 3-4 months it may cause vaginal bleeding/malodorous discharge.   - Discussed different POP repair options to consider such as the SSLS with APR vs. SCP.   - Gave her PI handout on SSLS and SCP to review and consider. She is primarily interested in a POP repair vs. pessary.     2. OAB, UUI  - Discussed OAB treatment options such as lifestyle changes, PFPT, medications, PTNS, intradetrusor Botox injections, or SNM.   - We discussed OAB lifestyle changes (i.e., trying to limit fluids to 60oz in total per day, timed voiding every 2-3 hours, stop drinking fluids 3 hours prior to bedtime, and avoiding/limiting bladder irritants such as caffeine, nicotine, artificial sweeteners, acidic/spicy foods, and alcohol).   - We discussed that PFPT may help with bladder/pelvic floor restrengthening exercises with an overall goal to better control the bladder and improve urinary symptoms. PFPT includes attending sessions with a female licensed and specialized pelvic floor physical therapist who does external and internal vaginal work to ensure she is doing the correct exercises to obtain the  most benefit of physical therapy. We reviewed the importance of continuing these home exercises to receive maximum benefit from PFPT. They also teach mind over bladder strategies/urge suppression techniques to reduce the intensity of the urge to void.   - We discussed that with starting an OAB medication the goal would be to allow the detrusor muscle around the bladder to relax and prevent the muscles from spasm/squeezing too frequently to allow the bladder to store more urine which reduces the urge, frequency, and incontinent episodes.   - Discussed third-line OAB treatment options and discussed the InterStim in detail as we believe she would benefit from this to address both her UUI and FI.   - She is considering SNM and we gave her PI handout to review/consider.      3. FI, fecal urgency, mixed stool consistency  - The patient has FI with formed stools around 2-3x per month.   - Upon exam she had minimal rectal squeeze 1 out of 5 and a + Dovetail sign likely from her prior fourth degree OASI that did not heal correctly.    - We dicussed treatment options to consider such as PFPT to help strengthen anal sphincter muscle control/coordination of this muscle, sphincteroplasty, or SNM.   - Discussed optimizing a daily fiber supplement (I.e. Psyllium fiber, Benefiber, and Metamucil) to help bulk the stools to prevent constipation and diarrhea. Of note, she has been taking Benefiber and we encouraged her to continue this daily.   - Counseled that the anal sphincter muscle has been  for over 46 years and data shows that a sphincteroplasty has a 50% chance of improving her bowel control at 5 years. We discussed that even with loss of the anal sphincter muscle control that the puborectalis muscle typically works to provide bowel control, however, with aging and loss of muscle mass over time this secondary muscle is no longer providing her with adequate bowel control. The sphincteroplasty has a risk of infection  and has a painful recovery due to anal spasms.   - We discussed consideration of pursuing sacral neuromodulation (SNM) for management of her OAB/FI. Reviewed the procedure for sacral neuromodulation and discussed it is a staged procedure which allows us to determine degree of symptom improvement during the PNE trial prior to implanting permanent device. She will keep a bladder/bowel diary to keep track of her symptoms to determine if she has had 50% or greater in her symptom improvement. Reviewed that if symptoms are at least 50% improved during the trial period we would implant the permanent device in the OR under sedation.  - Gave her PI handout on SNM to review and consider.      4. RADHA  - We discussed that in order to correctly treat her urinary issues she will need UDS to fully assess/diagnose the type of urinary problems she is having (i.e., RADHA vs. ISD vs. OAB). Urodynamics assesses bladder function and capacity, voiding/filling patterns, urethral strength, assess if there is PEDROZA, MUCP, etc.   - Counseled on the importance of UDS to evaluate if there will be occult RADHA after POP repair and if there is we would offer her an anti-incontinence procedure at the time of her POP repair (i.e. midurethral sling).     5. Recurrent uti  - will discuss at next visit. Consider vaginal estrogen       UDS  Calibrated electronic equipment was used throughout testing.      82656 PVR by ultrasound: 225 ml    89667 Complex cystometrogram with  and UPP-Simultaneous measurement of intraabdominal pressure using a vaginal/rectal catheter and bladder pressure using a urethral catheter. Cystometry was done.    Fill rate: 100 ml/min  First sensation occurred at 328 ml at a detrusor pressure of -5 cm H2O   Strong desire to void occurred at 407 ml with a detrusor pressure of -3 cm H2O.   Maximum capacity of 494 ml with a detrusor pressure of 0 cm H2O.  DO: none,   RADHA: none    Urethral Pressure Profile:   MUCP was 45 cm of  water  Functional urethral length of 1.3 cm.     Pressure Flow Study 33732 Voiding pressure studies ()? intraabdominal voiding pressure (AP)  A voiding pressure study was completed and performed utilizing both bladder and rectal catheters for detrusor, vesical, and abdominal pressure measurement.    Maximum flow rate: 13 ml/s  Average flow rate: 7 ml/s  Pdet at max flow: 13 cm H2)  Maximum detrusor pressure of 17 cm H2O     43002 EMG of anal or urethral sphincter, other than needle, any technique  Electromyography: Provocative maneuvers produced appropriate  changes in waveforms.     Summary  With prolapse reduced she has a normal capacity and compliance, no DO or RADHA. Voids via detrusor mechanism, however PVR was 224 after pressure flow study, consistent with her PVR of 225 when she presented for the study and PVR of 200 in clinic. No evidence of PEDROZA.    Can discuss prolapse repair, SNM. [facility] [place] on risk of retention with OAB medicatons due to already elevated PVR.        The printed report of the above testing will be scanned into the chart and can be found in the Media tab.

## 2024-02-20 ENCOUNTER — OFFICE VISIT (OUTPATIENT)
Dept: OBSTETRICS AND GYNECOLOGY | Facility: CLINIC | Age: 69
End: 2024-02-20
Payer: MEDICARE

## 2024-02-20 ENCOUNTER — PREP FOR PROCEDURE (OUTPATIENT)
Dept: OBSTETRICS AND GYNECOLOGY | Facility: CLINIC | Age: 69
End: 2024-02-20

## 2024-02-20 VITALS
HEIGHT: 63 IN | DIASTOLIC BLOOD PRESSURE: 92 MMHG | SYSTOLIC BLOOD PRESSURE: 134 MMHG | WEIGHT: 185 LBS | BODY MASS INDEX: 32.78 KG/M2

## 2024-02-20 DIAGNOSIS — N99.3 VAGINAL VAULT PROLAPSE AFTER HYSTERECTOMY: Primary | ICD-10-CM

## 2024-02-20 DIAGNOSIS — N32.81 OAB (OVERACTIVE BLADDER): ICD-10-CM

## 2024-02-20 DIAGNOSIS — N81.9 VAGINAL VAULT PROLAPSE: Primary | ICD-10-CM

## 2024-02-20 DIAGNOSIS — Z90.710 HISTORY OF HYSTERECTOMY: ICD-10-CM

## 2024-02-20 DIAGNOSIS — N81.6 POP-Q STAGE 2 RECTOCELE: ICD-10-CM

## 2024-02-20 DIAGNOSIS — Z01.818 PREOP TESTING: ICD-10-CM

## 2024-02-20 DIAGNOSIS — R15.9 INCONTINENCE OF FECES, UNSPECIFIED FECAL INCONTINENCE TYPE: ICD-10-CM

## 2024-02-20 PROCEDURE — 99214 OFFICE O/P EST MOD 30 MIN: CPT | Performed by: OBSTETRICS & GYNECOLOGY

## 2024-02-20 PROCEDURE — 1036F TOBACCO NON-USER: CPT | Performed by: OBSTETRICS & GYNECOLOGY

## 2024-02-20 PROCEDURE — 1159F MED LIST DOCD IN RCRD: CPT | Performed by: OBSTETRICS & GYNECOLOGY

## 2024-02-20 RX ORDER — ACETAMINOPHEN 325 MG/1
975 TABLET ORAL ONCE
Status: CANCELLED | OUTPATIENT
Start: 2024-02-20 | End: 2024-02-20

## 2024-02-20 RX ORDER — PHENAZOPYRIDINE HYDROCHLORIDE 200 MG/1
200 TABLET, FILM COATED ORAL ONCE
Status: CANCELLED | OUTPATIENT
Start: 2024-02-20 | End: 2024-02-20

## 2024-02-20 RX ORDER — CELECOXIB 400 MG/1
400 CAPSULE ORAL ONCE
Status: CANCELLED | OUTPATIENT
Start: 2024-02-20 | End: 2024-02-20

## 2024-02-20 RX ORDER — HEPARIN SODIUM 5000 [USP'U]/ML
5000 INJECTION, SOLUTION INTRAVENOUS; SUBCUTANEOUS ONCE
Status: CANCELLED | OUTPATIENT
Start: 2024-02-20 | End: 2024-02-20

## 2024-02-20 RX ORDER — SODIUM CHLORIDE, SODIUM LACTATE, POTASSIUM CHLORIDE, CALCIUM CHLORIDE 600; 310; 30; 20 MG/100ML; MG/100ML; MG/100ML; MG/100ML
100 INJECTION, SOLUTION INTRAVENOUS CONTINUOUS
Status: CANCELLED | OUTPATIENT
Start: 2024-02-20

## 2024-02-20 NOTE — PROGRESS NOTES
[facility] Department of Urogynecology   Ana Morales MD, MPH   [phone]    ASSESSMENT AND PLAN:   68 y.o. female with FI, OAB/UUI, recurrent vaginal vault prolapse s/p x3 previous repairs, Jose Luis, and vaginal atrophy. Comorbidities include: Hypothyroidism.      Diagnoses:  #1 Vaginal vault prolapse, recurrent   #2 OAB/UUI  #3 Fecal incontinence  #4 Urinary retention   #5 Recurrent UTI vs colonization     Plan:   1. Recurrent vaginal vault prolapse, urinary retention    - We reviewed recent UDS results - with prolapse reduced she has a normal capacity and compliance, no DO or RADHA. PVR was 224 after pressure flow study, consistent with her PVR of 225 when she presented for the study and PVR of 200 in clinic. No evidence of PEDROZA.   - She does have an elevated PVR, however it is not high enough to cause kidney damage.   - Of note, she is s/p x3 previous POP repairs with the last repair being in 2008. We discussed that prolapse surgeries can fail over time, but she did get about 16 years of sx relief which is generally what is expected after a POP repair. Patient denies mesh was used with a prior prolapse repair. Only the first prolapse repair was done through the abdomen and the other 2 were done vaginally. Other surgical hx includes a cholecystectomy.     - Discussed surgical management of her POP with the laparoscopic mesh surgery (SCP) or a native tissue repair done through the vagina without mesh (SSLS). She is s/p vaginal hyst. We discussed POP repair may help with bladder emptying. The mesh with SCP is polypropylene, the same mesh used for hernia repairs. The issues with vaginal mesh in the past was related to mesh used to fix POP placed through the vagina. Risks of mesh complications with SCP is 5% or less. With SSLS, it is done through the vagina so there is no risk of damage to bowels and no risk of mesh complication as it is a native tissue repair. There is a risk of nerve entrapment with SSLS.   It is normal to experience pain in the right buttock for a few days to up to a few weeks after SSLS. She can expect surgery to last 15-20 years. The SCP takes about 3-4 hours under anesthesia while SSLS is 2.5-3 hours. There is a less than 1% chance with the SCP of potential bowel injury. Both are same day surgeries and she would need about 6 weeks to recover. Success rates are about 75% over time.   - Patient elects to move forward with the SCP as this is believed to be the more durable procedure.   - A case request for the robotic SCP @ [facility] will be put in today. We will not do an anti-incontinence procedure at the time of surgery.     2. Recurrent UTI vs colonization   - Discussed the difference between recurrent UTI vs. colonization. A true UTI is when the patient is symptomatic (i.e. dysuria, urinary frequency/urgency, etc.) and has a + urine cx. If patient is asymptomatic of a UTI, this does not warrant treatment with antibiotics.      3. OAB   - We discussed her UUI is a sx of OAB and is not necessarily related to an infection. UUI could be related to her recurrent prolapse however.   - Will plan for prolapse repair and then re assess her OAB sxs. Some people do have improvements in urgency after prolapse repair. We will not start an OAB rx now due to risk of increased retention.   - SNM can be done after her POP repair for OAB and non obstructive urinary retention.     4. FI, prior fourth degree OASI  - Fixing POP will not repair bowel control and this is addressed separately.   - SNM can be done after surgery to improve bowel control.     Follow-up with Dr. Morales for SCP.     Scribe Attestation:   Fransisca COOK, tod scribing for virtually, and in the presence of Ana Morales MD MPH on 2/20/24 at 1:21 PM.   Problem List Items Addressed This Visit    None  Visit Diagnoses       Vaginal vault prolapse    -  Primary    OAB (overactive bladder)        Incontinence of feces, unspecified fecal  incontinence type        History of hysterectomy               I spent a total of 30 minutes in face to face and non face to face time.      Ana Morales MD, MPH, FACOG     Established    HISTORY OF PRESENT ILLNESS:     Maci Terry is a 68 y.o. female who presents with OAB/UUI and recurrent vaginal vault prolapse s/p x3 previous repairs. Here with her sister today.      Record Review:   - 2/7/24 UDS: With prolapse reduced she has a normal capacity and compliance, no DO or RADHA. Voids via detrusor mechanism, however PVR was 224 after pressure flow study, consistent with her PVR of 225 when she presented for the study and PVR of 200 in clinic. No evidence of PEDROZA. Can discuss prolapse repair, SNM. [place] on risk of retention with OAB medicatons due to already elevated PVR.   - 1/9/24 Dr. Ana Morales note reviewed: Recurrent vaginal vault prolapse, urinary retention - PVR = 200mL by s/c. 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 She then had a hysterectomy with another prolapse repair (described as APR) in [facility] in 1995. She then had another subsequent POP repair (described as an APR/suspension) in [place] in 2008 before she went to [facility]. The patient does not recall any of her prolapse repairs including mesh. Aa: 0. D: -1. C: +2. Ordered UDS. Gave her info on SCP and SSLS as she wants surgery. OAB - Discussed lifestyle changes, considering SNM. She was given info to read on SNM. FI - Considering SNM. Hx of prior 4th degree OASI. Discussed fiber for FI. RADHA - Ordered UDS prior to discussing treatment. Recurrent UTI - Discuss at next visit, consider vaginal estrogen.   - 10/6/2023 Urine culture: Klebsiella pneumonia/variicola >100,000 CFU/ML. Resistant to Ampicillin.   - 9/14/2023 Urine culture: Klebsiella pneumonia >100,000 CFU/ML. Resistant to Ampicillin.     Prolapse Hx:   - She has one op report from her prior prolapse repairs. Patient denies mesh was used  with a prior prolapse repair.   - Reports her first prolapse repair was done through the abdomen, the second POP repair was done at the time of a vaginal hyst, and the 3rd POP repair was done vaginally.      Urinary Symptoms:   - She says she had a UTI before UDS, but denies having any UTI sxs like urgency or frequency. She says sometimes she does have symptomatic UTIs.   - Leaks with urge on the way to the bathroom - discussed this may not be related to UTI and instead is an OAB sx.   - Voids every 2 hours at night.   - Many years ago, she took Tofranil for urine leakage.     Bowel Symptoms:   - Endorses FI.     Sexual Activity:   - Not currently sexually active.     Past Medical History:     - Her sister says patient is on Premarin cream.     Past Medical History:   Diagnosis Date    Right knee pain 05/30/2023    Skin lesion 05/30/2023     Past Surgical History:     - laparoscopic cholecystectomy   - Prior cystocele repair/MMK in 1981. She then had a hysterectomy with another prolapse repair (described as APR) in [facility] in 1995. She then had another POP repair (described as an APR/suspension) in [place] in 2008. The patient denies any of her prolapse repairs included mesh.      Past Surgical History:   Procedure Laterality Date    OTHER SURGICAL HISTORY  12/12/2019    Hysterectomy    OTHER SURGICAL HISTORY  12/12/2019    Cholecystectomy         Medications:       Prior to Admission medications    Medication Sig Start Date End Date Taking? Authorizing Provider   estrogens, conjugated, (Premarin) 0.45 mg tablet Take 1 tablet (0.45 mg) by mouth once daily. 10/31/23   Josee Cason DO   ibuprofen 800 mg tablet Take 1 tablet (800 mg) by mouth 3 times a day as needed for moderate pain (4 - 6). 9/27/23   Josee Cason DO   traZODone (Desyrel) 50 mg tablet Take 1 tablet (50 mg) by mouth as needed at bedtime for sleep. 11/20/23 11/19/24  DO ABIOLA Kemp  Review of Systems       PHYSICAL EXAM:   "    BP (!) 134/92   Ht 1.6 m (5' 3\")   Wt 83.9 kg (185 lb)   BMI 32.77 kg/m²      No LMP recorded. Patient is postmenopausal.      Declines chaperone for physical exam.      Well developed, well nourished, in no apparent distress.   Neurologic/Psychiatric:  Awake, Alert and Oriented times 3.  Affect normal.       Data and DIAGNOSTIC STUDIES REVIEWED   XR CHEST 2 VIEWS    Result Date: 1/31/2024  * * *Final Report* * * DATE OF EXAM: Jan 31 2024 12:31PM   ASX   [id number]  -  XR CHEST 2V FRONTAL/LAT  / ACCESSION #  [id number] PROCEDURE REASON: Cough      * * * * Physician Interpretation * * * *  EXAMINATION:  CHEST RADIOGRAPH (2 VIEW FRONTAL & LATERAL) CLINICAL HISTORY: Cough MQ:  [id number] EXAM DATE/TIME:  1/31/2024 12:31 PM COMPARISON:  No relevant prior studies available. RESULT: Lines, tubes, and devices:  None. Lungs and pleura:  Left upper lobe consolidation.  No pleural effusion or pneumothorax is identified. Cardiomediastinal silhouette:  The heart is not enlarged. Bones and soft tissues:  Degenerative changes of the spine.    IMPRESSION: Left upper lobe consolidation, likely pneumonia.  Recommend follow-up to complete resolution to exclude other etiologies. : PSCB   Transcribe Date/Time: Jan 31 2024 12:49P Dictated by : ARIANA HERRON MD This examination was interpreted and the report reviewed and electronically signed by: ARIANA HERRON MD on Jan 31 2024 12:50PM  EST     Lab Results   Component Value Date    URINECULTURE >100,000 Klebsiella pneumoniae/variicola (A) 01/24/2024      Lab Results   Component Value Date    GLUCOSE 101 (H) 07/13/2023    CALCIUM 8.6 07/13/2023     07/13/2023    K 4.4 07/13/2023    CO2 24 07/13/2023     07/13/2023    BUN 11 07/13/2023    CREATININE 0.8 07/13/2023     Lab Results   Component Value Date    WBC 5.5 07/13/2023    HGB 12.7 07/13/2023    HCT 38.4 07/13/2023    MCV 87.5 07/13/2023     07/13/2023        "

## 2024-02-21 PROBLEM — N81.6 POP-Q STAGE 2 RECTOCELE: Status: ACTIVE | Noted: 2024-02-20

## 2024-02-21 PROBLEM — N99.3 VAGINAL VAULT PROLAPSE AFTER HYSTERECTOMY: Status: ACTIVE | Noted: 2024-02-20

## 2024-03-08 ENCOUNTER — TELEPHONE (OUTPATIENT)
Dept: PREADMISSION TESTING | Facility: HOSPITAL | Age: 69
End: 2024-03-08
Payer: MEDICARE

## 2024-03-08 ENCOUNTER — TELEMEDICINE CLINICAL SUPPORT (OUTPATIENT)
Dept: PREADMISSION TESTING | Facility: HOSPITAL | Age: 69
End: 2024-03-08
Payer: MEDICARE

## 2024-03-08 DIAGNOSIS — Z01.818 PREOP TESTING: ICD-10-CM

## 2024-03-08 DIAGNOSIS — N81.6 POP-Q STAGE 2 RECTOCELE: ICD-10-CM

## 2024-03-08 DIAGNOSIS — N95.1 POSTMENOPAUSAL DISORDER: ICD-10-CM

## 2024-03-08 DIAGNOSIS — N99.3 VAGINAL VAULT PROLAPSE AFTER HYSTERECTOMY: ICD-10-CM

## 2024-03-08 RX ORDER — BISMUTH SUBSALICYLATE 262 MG
1 TABLET,CHEWABLE ORAL DAILY
COMMUNITY

## 2024-03-08 RX ORDER — ACETAMINOPHEN 325 MG/1
650 TABLET ORAL EVERY 6 HOURS PRN
Status: ON HOLD | COMMUNITY
End: 2024-03-29 | Stop reason: SDUPTHER

## 2024-03-12 NOTE — PROGRESS NOTES
"Maci Terry is a 68 y.o. female who presents for Medicare Annual Wellness Visit Subsequent (Had her knee surgery; having bladder procedure on 3/29)    Recurrent UTI: Following with urogyn. Planning for bladder lift on 3/29.      Right knee pain: Getting gucosamine injections with Teri Blake but considering getting replacements done within the next year.      Feet tingling, numbness: Going on for 6 or 8 months. Mostly on the soles of her feet. No pain. She does have a sister with neuropathy.      Hypothyroidism: off meds for about 4 months, completed labs last month.      Insomnia: Ambien stopped working. Hung over on trazodone. Doing ok without meds at this point.      All other systems reviewed and negative for complaint unless stated above.    Objective   /80 (BP Location: Left arm, Patient Position: Sitting, BP Cuff Size: Large adult)   Pulse 70   Ht 1.6 m (5' 3\")   Wt 85.7 kg (189 lb)   BMI 33.48 kg/m²     Gen: No acute distress, alert and oriented x3, pleasant   HEENT: moist mucous membranes, b/l external auditory canals are clear of debris, TMs within normal limits, no oropharyngeal lesions, eomi, perrla   Neck: thyroid within normal limits, no lymphadenopathy   CV: RRR, normal S1/S2, no murmur   Resp: Clear to auscultation bilaterally, no wheezes or rhonchi appreciated  Abd: soft, nontender, non-distended, no guarding/rigidity, bowel sounds present  Extr: no edema, no calf tenderness  Derm: Skin is warm and dry, no rashes appreciated  Psych: mood is good, affect is congruent, good hygiene, normal speech and eye contact  Neuro: cranial nerves grossly intact, normal gait    Assessment/Plan     #Knee pain   Following with a PA but looking into establishing with new ortho    #Incontinence  Following with Dr. Morales  Scheduled for bladder lift on 3/29     #Hot flashes  Doing well on premarin     #Dyslipidemia   Diet controlled     #Hypothyroidism  doing well off of meds     #Insomnia   Ambien stopped " working for her  She had fogginess on the trazodone     #Vitamin D   On replacement     HCM:  UTD for flu and PNA vaccines  C-scope 2022 with Mad[facility], next due 2032  Mammogram Dec negative

## 2024-03-15 ENCOUNTER — PRE-ADMISSION TESTING (OUTPATIENT)
Dept: PREADMISSION TESTING | Facility: HOSPITAL | Age: 69
End: 2024-03-15
Payer: MEDICARE

## 2024-03-15 ENCOUNTER — LAB (OUTPATIENT)
Dept: LAB | Facility: LAB | Age: 69
End: 2024-03-15
Payer: MEDICARE

## 2024-03-15 VITALS
HEIGHT: 63 IN | DIASTOLIC BLOOD PRESSURE: 56 MMHG | BODY MASS INDEX: 32.42 KG/M2 | OXYGEN SATURATION: 99 % | HEART RATE: 72 BPM | TEMPERATURE: 97.9 F | WEIGHT: 182.98 LBS | RESPIRATION RATE: 18 BRPM | SYSTOLIC BLOOD PRESSURE: 149 MMHG

## 2024-03-15 DIAGNOSIS — E55.9 VITAMIN D DEFICIENCY: ICD-10-CM

## 2024-03-15 DIAGNOSIS — N81.6 POP-Q STAGE 2 RECTOCELE: ICD-10-CM

## 2024-03-15 DIAGNOSIS — N99.3 VAGINAL VAULT PROLAPSE AFTER HYSTERECTOMY: ICD-10-CM

## 2024-03-15 DIAGNOSIS — Z01.818 PREOP TESTING: Primary | ICD-10-CM

## 2024-03-15 DIAGNOSIS — Z01.818 PREOP TESTING: ICD-10-CM

## 2024-03-15 DIAGNOSIS — E03.9 HYPOTHYROIDISM, UNSPECIFIED TYPE: ICD-10-CM

## 2024-03-15 LAB
25(OH)D3 SERPL-MCNC: 35 NG/ML (ref 30–100)
ABO GROUP (TYPE) IN BLOOD: NORMAL
ALBUMIN SERPL BCP-MCNC: 4.2 G/DL (ref 3.4–5)
ALP SERPL-CCNC: 94 U/L (ref 33–136)
ALT SERPL W P-5'-P-CCNC: 10 U/L (ref 7–45)
ANION GAP SERPL CALC-SCNC: 15 MMOL/L (ref 10–20)
ANTIBODY SCREEN: NORMAL
APPEARANCE UR: CLEAR
AST SERPL W P-5'-P-CCNC: 14 U/L (ref 9–39)
BASOPHILS # BLD AUTO: 0.04 X10*3/UL (ref 0–0.1)
BASOPHILS NFR BLD AUTO: 0.8 %
BILIRUB SERPL-MCNC: 0.8 MG/DL (ref 0–1.2)
BILIRUB UR STRIP.AUTO-MCNC: NEGATIVE MG/DL
BUN SERPL-MCNC: 18 MG/DL (ref 6–23)
CALCIUM SERPL-MCNC: 8.9 MG/DL (ref 8.6–10.3)
CHLORIDE SERPL-SCNC: 103 MMOL/L (ref 98–107)
CO2 SERPL-SCNC: 28 MMOL/L (ref 21–32)
COLOR UR: YELLOW
CREAT SERPL-MCNC: 0.63 MG/DL (ref 0.5–1.05)
EGFRCR SERPLBLD CKD-EPI 2021: >90 ML/MIN/1.73M*2
EOSINOPHIL # BLD AUTO: 0.05 X10*3/UL (ref 0–0.7)
EOSINOPHIL NFR BLD AUTO: 1 %
ERYTHROCYTE [DISTWIDTH] IN BLOOD BY AUTOMATED COUNT: 13.8 % (ref 11.5–14.5)
GLUCOSE SERPL-MCNC: 88 MG/DL (ref 74–99)
GLUCOSE UR STRIP.AUTO-MCNC: NEGATIVE MG/DL
HCT VFR BLD AUTO: 40.2 % (ref 36–46)
HGB BLD-MCNC: 12.7 G/DL (ref 12–16)
HOLD SPECIMEN: NORMAL
IMM GRANULOCYTES # BLD AUTO: 0.02 X10*3/UL (ref 0–0.7)
IMM GRANULOCYTES NFR BLD AUTO: 0.4 % (ref 0–0.9)
KETONES UR STRIP.AUTO-MCNC: NEGATIVE MG/DL
LEUKOCYTE ESTERASE UR QL STRIP.AUTO: NEGATIVE
LYMPHOCYTES # BLD AUTO: 1.48 X10*3/UL (ref 1.2–4.8)
LYMPHOCYTES NFR BLD AUTO: 31 %
MCH RBC QN AUTO: 28.2 PG (ref 26–34)
MCHC RBC AUTO-ENTMCNC: 31.6 G/DL (ref 32–36)
MCV RBC AUTO: 89 FL (ref 80–100)
MONOCYTES # BLD AUTO: 0.5 X10*3/UL (ref 0.1–1)
MONOCYTES NFR BLD AUTO: 10.5 %
NEUTROPHILS # BLD AUTO: 2.68 X10*3/UL (ref 1.2–7.7)
NEUTROPHILS NFR BLD AUTO: 56.3 %
NITRITE UR QL STRIP.AUTO: NEGATIVE
NRBC BLD-RTO: 0 /100 WBCS (ref 0–0)
PH UR STRIP.AUTO: 5 [PH]
PLATELET # BLD AUTO: 339 X10*3/UL (ref 150–450)
POTASSIUM SERPL-SCNC: 4.7 MMOL/L (ref 3.5–5.3)
PROT SERPL-MCNC: 6.7 G/DL (ref 6.4–8.2)
PROT UR STRIP.AUTO-MCNC: NEGATIVE MG/DL
RBC # BLD AUTO: 4.5 X10*6/UL (ref 4–5.2)
RBC # UR STRIP.AUTO: NEGATIVE /UL
RH FACTOR (ANTIGEN D): NORMAL
SODIUM SERPL-SCNC: 141 MMOL/L (ref 136–145)
SP GR UR STRIP.AUTO: 1.01
TSH SERPL-ACNC: 2.6 MIU/L (ref 0.44–3.98)
UROBILINOGEN UR STRIP.AUTO-MCNC: <2 MG/DL
WBC # BLD AUTO: 4.8 X10*3/UL (ref 4.4–11.3)

## 2024-03-15 PROCEDURE — 87081 CULTURE SCREEN ONLY: CPT | Mod: AHULAB | Performed by: OBSTETRICS & GYNECOLOGY

## 2024-03-15 PROCEDURE — 84443 ASSAY THYROID STIM HORMONE: CPT

## 2024-03-15 PROCEDURE — 86850 RBC ANTIBODY SCREEN: CPT

## 2024-03-15 PROCEDURE — 85025 COMPLETE CBC W/AUTO DIFF WBC: CPT

## 2024-03-15 PROCEDURE — 82306 VITAMIN D 25 HYDROXY: CPT

## 2024-03-15 PROCEDURE — 99204 OFFICE O/P NEW MOD 45 MIN: CPT | Performed by: PHYSICIAN ASSISTANT

## 2024-03-15 PROCEDURE — 81003 URINALYSIS AUTO W/O SCOPE: CPT

## 2024-03-15 PROCEDURE — 80053 COMPREHEN METABOLIC PANEL: CPT

## 2024-03-15 PROCEDURE — 86900 BLOOD TYPING SEROLOGIC ABO: CPT

## 2024-03-15 PROCEDURE — 36415 COLL VENOUS BLD VENIPUNCTURE: CPT

## 2024-03-15 PROCEDURE — 86901 BLOOD TYPING SEROLOGIC RH(D): CPT

## 2024-03-15 RX ORDER — CHLORHEXIDINE GLUCONATE ORAL RINSE 1.2 MG/ML
SOLUTION DENTAL
Qty: 473 ML | Refills: 0 | Status: SHIPPED | OUTPATIENT
Start: 2024-03-15 | End: 2024-03-29 | Stop reason: HOSPADM

## 2024-03-15 ASSESSMENT — ENCOUNTER SYMPTOMS
EYE ITCHING: 1
RESPIRATORY NEGATIVE: 1
NEUROLOGICAL NEGATIVE: 1
CONSTITUTIONAL NEGATIVE: 1
ENDOCRINE NEGATIVE: 1
MUSCULOSKELETAL NEGATIVE: 1
PSYCHIATRIC NEGATIVE: 1
ALLERGIC/IMMUNOLOGIC NEGATIVE: 1
HEMATOLOGIC/LYMPHATIC NEGATIVE: 1
CARDIOVASCULAR NEGATIVE: 1
GASTROINTESTINAL NEGATIVE: 1

## 2024-03-15 NOTE — PREPROCEDURE INSTRUCTIONS
Medication List            Accurate as of March 15, 2024  8:55 AM. Always use your most recent med list.                acetaminophen 325 mg tablet  Commonly known as: Tylenol  Notes to patient: Use if needed morning of surgery     estrogens (conjugated) 0.45 mg tablet  Commonly known as: Premarin  Take 1 tablet (0.45 mg) by mouth once daily.  Medication Adjustments for Surgery: Take morning of surgery with sip of water, no other fluids     ibuprofen 800 mg tablet  Take 1 tablet (800 mg) by mouth 3 times a day as needed for moderate pain (4 - 6).  Medication Adjustments for Surgery: Stop 7 days before surgery     multivitamin tablet  Medication Adjustments for Surgery: Stop 7 days before surgery     traZODone 50 mg tablet  Commonly known as: Desyrel  Take 1 tablet (50 mg) by mouth as needed at bedtime for sleep.  Notes to patient: Continue night prior to surgery                 CONTACT SURGEON'S OFFICE IF YOU DEVELOP:  * Fever = 100.4 F   * New respiratory symptoms (e.g. cough, shortness of breath, respiratory distress, sore throat)  * Recent loss of taste or smell  *Flu like symptoms such as headache, fatigue or gastrointestinal symptoms  * You develop any open sores, shingles, burning or painful urination   AND/OR:  * You no longer wish to have the surgery.  * Any other personal circumstances change that may lead to the need to cancel or defer this surgery.  *You were admitted to any hospital within one week of your planned procedure.    SMOKING:  *Quitting smoking can make a huge difference to your health and recovery from surgery.    *If you need help with quitting, call 0-201-QUIT-NOW.    THE DAY BEFORE SURGERY:  *Do not eat any food after midnight the night before surgery/procedure.   *You are permitted to drink clear liquids (i.e. water, black coffee/tea, (no milk or cream) apple juice, and electrolyte drinks (Gatorade)10 ounces up to 2 hours before your instructed arrival time to the hospital.  *You may  chew gum until  2 hours before your surgery/procedure.    SURGICAL TIME  *You will be contacted between 2 p.m. and 6 p.m. the business day before your surgery with your arrival time.  *If you haven't received a call by 6pm, call [phone].  *Scheduled surgery times may change and you will be notified if this occurs-check your personal voicemail for any updates.    ON THE MORNING OF SURGERY:  *Wear comfortable, loose fitting clothing.   *Do not use moisturizers, creams, lotions or perfume.  *All jewelry and valuables should be left at home.  *Prosthetic devices such as contact lenses, hearing aids, dentures, eyelash extensions, hairpins and body piercing must be removed before surgery.    BRING WITH YOU:  *Photo ID and insurance card  *Current list of medicines and allergies  *Pacemaker/Defibrillator/Heart stent cards  *CPAP machine and mask  *Slings/splints/crutches  *Copy of your complete Advanced Directive/DHPOA-if applicable  *Neurostimulator implant remote    PARKING AND ARRIVAL:  *Check in at the Main Entrance desk and let them know you are here for surgery.  *You will be directed to the 2nd floor surgical waiting area.    AFTER OUTPATIENT SURGERY:  *A responsible adult MUST accompany you at the time of discharge and stay with you for 24 hours after your surgery.  *You may NOT drive yourself home after surgery.  *You may use a taxi or ride sharing service (Lyft, Uber) to return home ONLY if you are accompanied by a friend or family member.  *Instructions for resuming your medications will be provided by your surgeon.

## 2024-03-15 NOTE — CPM/PAT H&P
[facility] Evaluation       Name: Maci Terry (Maci Terry)  /Age: 1955/68 y.o.     In-Person       Chief Complaint: Vaginal vault prolapse after hysterectomy / POP-Q stage 2 rectocele     HPI    Date of Consult: 3/15/24    Referring Provider: Dr. Morales    Surgery, Date, and Length: Robotic Assisted Sacrocolpopexy; Posterior Repair; Possible Perineorrhaphy; Cystoscopy ; 3/29/24; 240 minutes     Maci Terry is a 68 year-old female who presents to the [facility] for perioperative risk assessment prior to surgery.  Admits to OAB with urinary urgency.  Denies stress incontinence.  Long history recurrent UTIs.      This note was created in part upon personal review of patient's medical records.      Patient is scheduled to have Robotic Assisted Sacrocolpopexy; Posterior Repair; Possible Perineorrhaphy  Cystoscopy     Medical History  Past Medical History:   Diagnosis Date    Arthritis     Incontinence in female     Insomnia     Pes anserinus bursitis of right knee     POP-Q stage 2 rectocele     Spinal headache     Vaginal vault prolapse after hysterectomy     Vitamin D deficiency         STOP BANG = 1   (>49yo)    Caprini =  5  (age, surgery,BMI>25)    Surgical History  Past Surgical History:   Procedure Laterality Date    CHOLECYSTECTOMY      CYSTOCELE REPAIR      1970s    HYSTERECTOMY      with prolapse repair    KNEE CARTILAGE SURGERY Right 2023    OTHER SURGICAL HISTORY      bladder and rectal prolapse repair    TUBAL LIGATION               Family history:  Family History   Problem Relation Name Age of Onset    No Known Problems Mother      No Known Problems Father      No Known Problems Sister      No Known Problems Brother          Social history:  Social History     Socioeconomic History    Marital status: Single     Spouse name: Not on file    Number of children: Not on file    Years of education: Not on file    Highest education level: Not on file   Occupational History    Not on file   Tobacco Use     "Smoking status: Never     Passive exposure: Never    Smokeless tobacco: Never   Vaping Use    Vaping Use: Never used   Substance and Sexual Activity    Alcohol use: Not Currently    Drug use: Never    Sexual activity: Defer   Other Topics Concern    Not on file   Social History Narrative    Not on file     Social Determinants of Health     Financial Resource Strain: Not on file   Food Insecurity: Not on file   Transportation Needs: Not on file   Physical Activity: Not on file   Stress: Not on file   Social Connections: Not on file   Intimate Partner Violence: Not on file   Housing Stability: Not on file          Current Outpatient Medications:     acetaminophen (Tylenol) 325 mg tablet, Take 2 tablets (650 mg) by mouth every 6 hours if needed for mild pain (1 - 3)., Disp: , Rfl:     estrogens, conjugated, (Premarin) 0.45 mg tablet, Take 1 tablet (0.45 mg) by mouth once daily., Disp: 90 tablet, Rfl: 3    ibuprofen 800 mg tablet, Take 1 tablet (800 mg) by mouth 3 times a day as needed for moderate pain (4 - 6)., Disp: 180 tablet, Rfl: 1    multivitamin tablet, Take 1 tablet by mouth once daily., Disp: , Rfl:     chlorhexidine (Peridex) 0.12 % solution, 15 ml swish and spit for 30 seconds night prior to surgery and morning of surgery, Disp: 473 mL, Rfl: 0    traZODone (Desyrel) 50 mg tablet, Take 1 tablet (50 mg) by mouth as needed at bedtime for sleep. (Patient not taking: Reported on 3/15/2024), Disp: 30 tablet, Rfl: 1       Visit Vitals  /56   Pulse 72   Temp 36.6 °C (97.9 °F)   Resp 18   Ht 1.6 m (5' 3\")   Wt 83 kg (182 lb 15.7 oz)   SpO2 99%   BMI 32.41 kg/m²   OB Status Postmenopausal   Smoking Status Never   BSA 1.92 m²      Review of Systems   Constitutional: Negative.    HENT:  Positive for postnasal drip.    Eyes:  Positive for itching.   Respiratory: Negative.     Cardiovascular: Negative.         METS 4 able to take 2 flights stairs / walk in from lot /  house work without difficulty "   Gastrointestinal: Negative.    Endocrine: Negative.    Genitourinary:  Positive for urgency.   Musculoskeletal: Negative.    Skin: Negative.    Allergic/Immunologic: Negative.    Neurological: Negative.    Hematological: Negative.    Psychiatric/Behavioral: Negative.          Physical Exam  Vitals reviewed.   Constitutional:       Appearance: Normal appearance.   HENT:      Head: Normocephalic and atraumatic.      Right Ear: External ear normal.      Left Ear: External ear normal.      Nose: Nose normal.      Mouth/Throat:      Pharynx: Oropharynx is clear.   Eyes:      Extraocular Movements: Extraocular movements intact.      Conjunctiva/sclera: Conjunctivae normal.      Pupils: Pupils are equal, round, and reactive to light.      Comments: glasses   Cardiovascular:      Rate and Rhythm: Normal rate and regular rhythm.      Heart sounds: Normal heart sounds.   Pulmonary:      Effort: Pulmonary effort is normal.      Breath sounds: Normal breath sounds.   Abdominal:      Palpations: Abdomen is soft.   Musculoskeletal:         General: Normal range of motion.      Cervical back: Normal range of motion and neck supple.   Skin:     General: Skin is warm and dry.   Neurological:      General: No focal deficit present.      Mental Status: She is alert and oriented to person, place, and time.   Psychiatric:         Mood and Affect: Mood normal.         Behavior: Behavior normal.          PAT AIRWAY:   Airway:     Mallampati::  II    Neck ROM::  Full    Lab Results   Component Value Date    WBC 4.8 03/15/2024    HGB 12.7 03/15/2024    HCT 40.2 03/15/2024    MCV 89 03/15/2024     03/15/2024     Lab Results   Component Value Date    GLUCOSE 88 03/15/2024    CALCIUM 8.9 03/15/2024     03/15/2024    K 4.7 03/15/2024    CO2 28 03/15/2024     03/15/2024    BUN 18 03/15/2024    CREATININE 0.63 03/15/2024      UA - negative     RCRI  1  , 6 % Risk of MACE    Hematology       Patient instructed to ambulate as  soon as possible postoperatively to decrease thromboembolic risk.      Initiate mechanical DVT prophylaxis as soon as possible and initiate chemical prophylaxis when deemed safe from a bleeding standpoint post surgery.       VTE prophylaxis per surgical team       Tests ordered in PAT: cbc, bmp,t&s, ua,mrsa  LABS REVIEWED : unremarkable      Risk assessment complete.  Patient is scheduled for a intermediate surgical risk procedure.        Preoperative medication instructions were provided and reviewed with the patient.  Any additional testing or evaluation was explained to the patient.  Nothing by mouth instructions were discussed and patient's questions were answered prior to conclusion to this encounter.  Patient verbalized understanding of preoperative instructions given in preadmission testing; discharge instructions available in EMR.    This note was dictated by a speech recognition.  Minor errors may have been detected in a speech recognition.

## 2024-03-17 LAB — STAPHYLOCOCCUS SPEC CULT: NORMAL

## 2024-03-18 ENCOUNTER — OFFICE VISIT (OUTPATIENT)
Dept: ORTHOPEDIC SURGERY | Facility: CLINIC | Age: 69
End: 2024-03-18
Payer: MEDICARE

## 2024-03-18 DIAGNOSIS — M17.0 BILATERAL PRIMARY OSTEOARTHRITIS OF KNEE: Primary | ICD-10-CM

## 2024-03-18 PROCEDURE — 1159F MED LIST DOCD IN RCRD: CPT

## 2024-03-18 PROCEDURE — 99213 OFFICE O/P EST LOW 20 MIN: CPT

## 2024-03-18 PROCEDURE — 20610 DRAIN/INJ JOINT/BURSA W/O US: CPT

## 2024-03-18 PROCEDURE — 1036F TOBACCO NON-USER: CPT

## 2024-03-18 PROCEDURE — 1160F RVW MEDS BY RX/DR IN RCRD: CPT

## 2024-03-18 RX ORDER — TRIAMCINOLONE ACETONIDE 40 MG/ML
40 INJECTION, SUSPENSION INTRA-ARTICULAR; INTRAMUSCULAR
Status: COMPLETED | OUTPATIENT
Start: 2024-03-18 | End: 2024-03-18

## 2024-03-18 RX ORDER — LIDOCAINE HYDROCHLORIDE 5 MG/ML
4 INJECTION, SOLUTION INFILTRATION; PERINEURAL
Status: COMPLETED | OUTPATIENT
Start: 2024-03-18 | End: 2024-03-18

## 2024-03-18 RX ADMIN — TRIAMCINOLONE ACETONIDE 40 MG: 40 INJECTION, SUSPENSION INTRA-ARTICULAR; INTRAMUSCULAR at 11:58

## 2024-03-18 RX ADMIN — LIDOCAINE HYDROCHLORIDE 4 ML: 5 INJECTION, SOLUTION INFILTRATION; PERINEURAL at 11:58

## 2024-03-18 NOTE — PROGRESS NOTES
"HPI  Maci Terry is a 68 y.o. female  in office today for   Chief Complaint   Patient presents with    Right Knee - Pain     Pt was seeing Dr. Allison for gel injections in the right knee- she would like to continue those today. She also has increasing pain in the left knee, would like a CSI in that knee today. Pt feels she has a \"tear\" in the back of right knee and that her right knee \"gives out\" at random times. Her pain is worse with activity and walking. She has a history of meniscus surgery(July 17,2023)-She knows she will eventually need a knee replacement, but does not want to schedule anything yet, as she has other procedures she needs to get through first. She has been taking tylenol for pain, not daily. She feels her knees are inhibiting her from being more active than she would like to be.     Left Knee - Pain       Past Medical History: Arthritis bilateral knees, meniscus tear right knee    Medication  Current Outpatient Medications on File Prior to Visit   Medication Sig Dispense Refill    acetaminophen (Tylenol) 325 mg tablet Take 2 tablets (650 mg) by mouth every 6 hours if needed for mild pain (1 - 3).      chlorhexidine (Peridex) 0.12 % solution 15 ml swish and spit for 30 seconds night prior to surgery and morning of surgery 473 mL 0    estrogens, conjugated, (Premarin) 0.45 mg tablet Take 1 tablet (0.45 mg) by mouth once daily. 90 tablet 3    ibuprofen 800 mg tablet Take 1 tablet (800 mg) by mouth 3 times a day as needed for moderate pain (4 - 6). 180 tablet 1    multivitamin tablet Take 1 tablet by mouth once daily.      traZODone (Desyrel) 50 mg tablet Take 1 tablet (50 mg) by mouth as needed at bedtime for sleep. (Patient not taking: Reported on 3/15/2024) 30 tablet 1     No current facility-administered medications on file prior to visit.       Physical Exam  Constitutional: well developed, well nourished female in no acute distress  Psychiatric: normal mood, appropriate affect  Eyes: sclera " anicteric  HENT: normocephalic/atraumatic  CV: regular rate and rhythm   Respiratory: non labored breathing  Integumentary: no rash  Neurological: moves all extremities    Right Knee Exam     Muscle Strength   The patient has normal right knee strength.    Tenderness   The patient is experiencing tenderness in the medial joint line, lateral joint line and medial hamstring.    Range of Motion   The patient has normal right knee ROM.    Tests   Varus: negative Valgus: negative  Drawer:  Anterior - negative    Posterior - negative  Patellar apprehension: negative    Other   Erythema: absent  Sensation: normal  Pulse: present  Swelling: mild      Left Knee Exam     Muscle Strength   The patient has normal left knee strength.    Tenderness   The patient is experiencing tenderness in the lateral joint line and medial joint line.    Range of Motion   The patient has normal left knee ROM.    Tests   Varus: negative Valgus: negative  Drawer:  Anterior - negative     Posterior - negative  Patellar apprehension: negative    Other   Erythema: absent  Sensation: normal  Pulse: present  Swelling: mild          Patient ID: Maci Terry is a 68 y.o. female.    L Inj/Asp: bilateral knee on 3/18/2024 11:58 AM  Indications: pain  Details: 22 G needle, anterolateral approach  Medications (Right): 60 mg sodium hyaluronate 60 mg/3 mL  Medications (Left): 40 mg triamcinolone acetonide 40 mg/mL; 4 mL lidocaine 5 mg/mL (0.5 %)  Outcome: tolerated well, no immediate complications  Procedure, treatment alternatives, risks and benefits explained, specific risks discussed. Consent was given by the patient. Immediately prior to procedure a time out was called to verify the correct patient, procedure, equipment, support staff and site/side marked as required. Patient was prepped and draped in the usual sterile fashion.           Imaging/Lab:  X-rays were taken 9/14/2023 and 7/06/23 of R and L knees respectively. R knee xray shows mild joint space  narrowing of the right knee greatest in the medial   compartment. L knee xray shows mild degenerative changes which include joint space narrowing, spurring, subchondral sclerosis.       Assessment  Assessment: Bilateral knee pain. Bilateral knee osteoarthritis. R knee meniscus tear sequela    Plan  Plan:  History, physical exam, and imaging were reviewed with patient. Discussed injection of hyaluronic acid and CSI. Pt wished to have gel in right knee and CSI in left knee. Discussed long term goals at length. Patient understands cortisone injections can only be given every 3 months, gel every 6 months. Will schedule to discuss possibility of R knee replacement in the future. Patient agreeable to plan.    Follow Up: As needed     All questions were answered for the patient prior to end of exam and patient addressed their understanding.    Mukund BISWAS  03/18/24

## 2024-03-18 NOTE — PROGRESS NOTES
"HPI  Maci Terry is a 68 y.o. female  in office today for   Chief Complaint   Patient presents with    Right Knee - Pain     Pt was seeing Dr. Allison for gel injections in the right knee- she would like to continue those today. She also has a lot of knee pain in the left knee, would like a CSI in that knee today.. Pt feels she has a \"tear\" in the back of right knee. She has a history of meniscus surgery(July 17,2023)-She knows she will eventually need a knee replacement, but does not want to schedule anything yet.    Left Knee - Pain   .  she ***    Past Medical History: ***    Medication  Current Outpatient Medications on File Prior to Visit   Medication Sig Dispense Refill    acetaminophen (Tylenol) 325 mg tablet Take 2 tablets (650 mg) by mouth every 6 hours if needed for mild pain (1 - 3).      chlorhexidine (Peridex) 0.12 % solution 15 ml swish and spit for 30 seconds night prior to surgery and morning of surgery 473 mL 0    estrogens, conjugated, (Premarin) 0.45 mg tablet Take 1 tablet (0.45 mg) by mouth once daily. 90 tablet 3    ibuprofen 800 mg tablet Take 1 tablet (800 mg) by mouth 3 times a day as needed for moderate pain (4 - 6). 180 tablet 1    multivitamin tablet Take 1 tablet by mouth once daily.      traZODone (Desyrel) 50 mg tablet Take 1 tablet (50 mg) by mouth as needed at bedtime for sleep. (Patient not taking: Reported on 3/15/2024) 30 tablet 1     No current facility-administered medications on file prior to visit.       Physical Exam  Constitutional: well developed, well nourished female in no acute distress  Psychiatric: normal mood, appropriate affect  Eyes: sclera anicteric  HENT: normocephalic/atraumatic  CV: regular rate and rhythm   Respiratory: non labored breathing  Integumentary: no rash  Neurological: moves all extremities    Ortho Exam      Imaging/Lab:  X-rays were taken *** which were reviewed by myself and read by *** and show ***      Assessment  Assessment: ***    Plan  Plan:  " History, physical exam, and imaging were reviewed with patient. ***  Education: ***  Medication: ***  Follow Up: ***    All questions were answered for the patient prior to end of exam and patient addressed their understanding.    Teri Blake PA-C  03/18/24

## 2024-03-20 ENCOUNTER — OFFICE VISIT (OUTPATIENT)
Dept: PRIMARY CARE | Facility: CLINIC | Age: 69
End: 2024-03-20
Payer: MEDICARE

## 2024-03-20 VITALS
HEIGHT: 63 IN | DIASTOLIC BLOOD PRESSURE: 78 MMHG | BODY MASS INDEX: 33.49 KG/M2 | WEIGHT: 189 LBS | HEART RATE: 70 BPM | SYSTOLIC BLOOD PRESSURE: 131 MMHG

## 2024-03-20 DIAGNOSIS — M17.11 ARTHRITIS OF KNEE, RIGHT: ICD-10-CM

## 2024-03-20 DIAGNOSIS — M00.9 PYOGENIC ARTHRITIS OF KNEE, DUE TO UNSPECIFIED ORGANISM, UNSPECIFIED LATERALITY (MULTI): Primary | ICD-10-CM

## 2024-03-20 PROCEDURE — 1160F RVW MEDS BY RX/DR IN RCRD: CPT | Performed by: FAMILY MEDICINE

## 2024-03-20 PROCEDURE — 1170F FXNL STATUS ASSESSED: CPT | Performed by: FAMILY MEDICINE

## 2024-03-20 PROCEDURE — G0439 PPPS, SUBSEQ VISIT: HCPCS | Performed by: FAMILY MEDICINE

## 2024-03-20 PROCEDURE — 1158F ADVNC CARE PLAN TLK DOCD: CPT | Performed by: FAMILY MEDICINE

## 2024-03-20 PROCEDURE — 1159F MED LIST DOCD IN RCRD: CPT | Performed by: FAMILY MEDICINE

## 2024-03-20 PROCEDURE — 1123F ACP DISCUSS/DSCN MKR DOCD: CPT | Performed by: FAMILY MEDICINE

## 2024-03-20 PROCEDURE — 1036F TOBACCO NON-USER: CPT | Performed by: FAMILY MEDICINE

## 2024-03-20 RX ORDER — TRAMADOL HYDROCHLORIDE 50 MG/1
50 TABLET ORAL EVERY 8 HOURS PRN
Qty: 25 TABLET | Refills: 0 | Status: SHIPPED | OUTPATIENT
Start: 2024-03-20 | End: 2024-03-29 | Stop reason: HOSPADM

## 2024-03-20 ASSESSMENT — PATIENT HEALTH QUESTIONNAIRE - PHQ9
SUM OF ALL RESPONSES TO PHQ9 QUESTIONS 1 AND 2: 0
2. FEELING DOWN, DEPRESSED OR HOPELESS: NOT AT ALL
1. LITTLE INTEREST OR PLEASURE IN DOING THINGS: NOT AT ALL

## 2024-03-20 ASSESSMENT — ACTIVITIES OF DAILY LIVING (ADL)
DRESSING: INDEPENDENT
MANAGING_FINANCES: INDEPENDENT
BATHING: INDEPENDENT
DOING_HOUSEWORK: INDEPENDENT
GROCERY_SHOPPING: INDEPENDENT
TAKING_MEDICATION: INDEPENDENT

## 2024-03-20 NOTE — PATIENT INSTRUCTIONS
Orthopedics:   [facility] [phone] (Jessica)  [facility]: [phone] (Dr. Ochoa)

## 2024-03-29 ENCOUNTER — ANESTHESIA (OUTPATIENT)
Dept: OPERATING ROOM | Facility: HOSPITAL | Age: 69
End: 2024-03-29
Payer: MEDICARE

## 2024-03-29 ENCOUNTER — HOSPITAL ENCOUNTER (OUTPATIENT)
Facility: HOSPITAL | Age: 69
Setting detail: OUTPATIENT SURGERY
Discharge: HOME | End: 2024-03-29
Attending: OBSTETRICS & GYNECOLOGY | Admitting: OBSTETRICS & GYNECOLOGY
Payer: MEDICARE

## 2024-03-29 ENCOUNTER — ANESTHESIA EVENT (OUTPATIENT)
Dept: OPERATING ROOM | Facility: HOSPITAL | Age: 69
End: 2024-03-29
Payer: MEDICARE

## 2024-03-29 VITALS
BODY MASS INDEX: 32.7 KG/M2 | RESPIRATION RATE: 22 BRPM | WEIGHT: 184.53 LBS | DIASTOLIC BLOOD PRESSURE: 76 MMHG | SYSTOLIC BLOOD PRESSURE: 136 MMHG | TEMPERATURE: 97.9 F | OXYGEN SATURATION: 95 % | HEIGHT: 63 IN | HEART RATE: 91 BPM

## 2024-03-29 DIAGNOSIS — N99.3 VAGINAL VAULT PROLAPSE AFTER HYSTERECTOMY: ICD-10-CM

## 2024-03-29 DIAGNOSIS — M25.562 CHRONIC PAIN OF LEFT KNEE: ICD-10-CM

## 2024-03-29 DIAGNOSIS — K59.00 CONSTIPATION, UNSPECIFIED CONSTIPATION TYPE: Primary | ICD-10-CM

## 2024-03-29 DIAGNOSIS — R52 MILD PAIN: ICD-10-CM

## 2024-03-29 DIAGNOSIS — N81.6 POP-Q STAGE 2 RECTOCELE: ICD-10-CM

## 2024-03-29 DIAGNOSIS — G89.18 POSTOPERATIVE PAIN: ICD-10-CM

## 2024-03-29 DIAGNOSIS — G89.29 CHRONIC PAIN OF LEFT KNEE: ICD-10-CM

## 2024-03-29 LAB
ABO GROUP (TYPE) IN BLOOD: NORMAL
RH FACTOR (ANTIGEN D): NORMAL

## 2024-03-29 PROCEDURE — 96372 THER/PROPH/DIAG INJ SC/IM: CPT | Mod: 59 | Performed by: OBSTETRICS & GYNECOLOGY

## 2024-03-29 PROCEDURE — 57425 LAPAROSCOPY SURG COLPOPEXY: CPT | Performed by: OBSTETRICS & GYNECOLOGY

## 2024-03-29 PROCEDURE — 2500000005 HC RX 250 GENERAL PHARMACY W/O HCPCS: Performed by: OBSTETRICS & GYNECOLOGY

## 2024-03-29 PROCEDURE — 2500000004 HC RX 250 GENERAL PHARMACY W/ HCPCS (ALT 636 FOR OP/ED): Performed by: ANESTHESIOLOGIST ASSISTANT

## 2024-03-29 PROCEDURE — 2500000004 HC RX 250 GENERAL PHARMACY W/ HCPCS (ALT 636 FOR OP/ED): Performed by: NURSE ANESTHETIST, CERTIFIED REGISTERED

## 2024-03-29 PROCEDURE — 2500000004 HC RX 250 GENERAL PHARMACY W/ HCPCS (ALT 636 FOR OP/ED): Performed by: OBSTETRICS & GYNECOLOGY

## 2024-03-29 PROCEDURE — 2500000005 HC RX 250 GENERAL PHARMACY W/O HCPCS: Performed by: NURSE ANESTHETIST, CERTIFIED REGISTERED

## 2024-03-29 PROCEDURE — 3700000002 HC GENERAL ANESTHESIA TIME - EACH INCREMENTAL 1 MINUTE: Performed by: OBSTETRICS & GYNECOLOGY

## 2024-03-29 PROCEDURE — 7100000010 HC PHASE TWO TIME - EACH INCREMENTAL 1 MINUTE: Performed by: OBSTETRICS & GYNECOLOGY

## 2024-03-29 PROCEDURE — 7100000002 HC RECOVERY ROOM TIME - EACH INCREMENTAL 1 MINUTE: Performed by: OBSTETRICS & GYNECOLOGY

## 2024-03-29 PROCEDURE — A57425 PR LAPAROSCOPY, SURG, COLPOPEXY: Performed by: ANESTHESIOLOGIST ASSISTANT

## 2024-03-29 PROCEDURE — 2500000005 HC RX 250 GENERAL PHARMACY W/O HCPCS: Performed by: ANESTHESIOLOGIST ASSISTANT

## 2024-03-29 PROCEDURE — 2720000007 HC OR 272 NO HCPCS: Performed by: OBSTETRICS & GYNECOLOGY

## 2024-03-29 PROCEDURE — 2500000001 HC RX 250 WO HCPCS SELF ADMINISTERED DRUGS (ALT 637 FOR MEDICARE OP): Performed by: OBSTETRICS & GYNECOLOGY

## 2024-03-29 PROCEDURE — 3600000004 HC OR TIME - INITIAL BASE CHARGE - PROCEDURE LEVEL FOUR: Performed by: OBSTETRICS & GYNECOLOGY

## 2024-03-29 PROCEDURE — 36415 COLL VENOUS BLD VENIPUNCTURE: CPT | Performed by: OBSTETRICS & GYNECOLOGY

## 2024-03-29 PROCEDURE — 2780000003 HC OR 278 NO HCPCS: Performed by: OBSTETRICS & GYNECOLOGY

## 2024-03-29 PROCEDURE — 3600000009 HC OR TIME - EACH INCREMENTAL 1 MINUTE - PROCEDURE LEVEL FOUR: Performed by: OBSTETRICS & GYNECOLOGY

## 2024-03-29 PROCEDURE — S2900 ROBOTIC SURGICAL SYSTEM: HCPCS | Performed by: OBSTETRICS & GYNECOLOGY

## 2024-03-29 PROCEDURE — 57250 REPAIR RECTUM & VAGINA: CPT | Performed by: OBSTETRICS & GYNECOLOGY

## 2024-03-29 PROCEDURE — 2500000001 HC RX 250 WO HCPCS SELF ADMINISTERED DRUGS (ALT 637 FOR MEDICARE OP): Performed by: ANESTHESIOLOGY

## 2024-03-29 PROCEDURE — C1781 MESH (IMPLANTABLE): HCPCS | Performed by: OBSTETRICS & GYNECOLOGY

## 2024-03-29 PROCEDURE — 3700000001 HC GENERAL ANESTHESIA TIME - INITIAL BASE CHARGE: Performed by: OBSTETRICS & GYNECOLOGY

## 2024-03-29 PROCEDURE — 7100000001 HC RECOVERY ROOM TIME - INITIAL BASE CHARGE: Performed by: OBSTETRICS & GYNECOLOGY

## 2024-03-29 PROCEDURE — 7100000009 HC PHASE TWO TIME - INITIAL BASE CHARGE: Performed by: OBSTETRICS & GYNECOLOGY

## 2024-03-29 PROCEDURE — A57425 PR LAPAROSCOPY, SURG, COLPOPEXY: Performed by: ANESTHESIOLOGY

## 2024-03-29 DEVICE — MESH, RESTORELLE Y, 20 X 4CM, F/ROBOTIC SACROCOLPOPEXY: Type: IMPLANTABLE DEVICE | Site: PELVIS | Status: FUNCTIONAL

## 2024-03-29 RX ORDER — MIDAZOLAM HYDROCHLORIDE 1 MG/ML
INJECTION INTRAMUSCULAR; INTRAVENOUS AS NEEDED
Status: DISCONTINUED | OUTPATIENT
Start: 2024-03-29 | End: 2024-03-29

## 2024-03-29 RX ORDER — PROPOFOL 10 MG/ML
INJECTION, EMULSION INTRAVENOUS AS NEEDED
Status: DISCONTINUED | OUTPATIENT
Start: 2024-03-29 | End: 2024-03-29

## 2024-03-29 RX ORDER — ACETAMINOPHEN 325 MG/1
650 TABLET ORAL EVERY 6 HOURS PRN
Qty: 30 TABLET | Refills: 0 | Status: SHIPPED | OUTPATIENT
Start: 2024-03-29

## 2024-03-29 RX ORDER — SODIUM CHLORIDE, SODIUM LACTATE, POTASSIUM CHLORIDE, CALCIUM CHLORIDE 600; 310; 30; 20 MG/100ML; MG/100ML; MG/100ML; MG/100ML
100 INJECTION, SOLUTION INTRAVENOUS CONTINUOUS
Status: DISCONTINUED | OUTPATIENT
Start: 2024-03-29 | End: 2024-03-29 | Stop reason: HOSPADM

## 2024-03-29 RX ORDER — LIDOCAINE HYDROCHLORIDE 20 MG/ML
INJECTION, SOLUTION INFILTRATION; PERINEURAL AS NEEDED
Status: DISCONTINUED | OUTPATIENT
Start: 2024-03-29 | End: 2024-03-29

## 2024-03-29 RX ORDER — CELECOXIB 200 MG/1
400 CAPSULE ORAL ONCE
Status: COMPLETED | OUTPATIENT
Start: 2024-03-29 | End: 2024-03-29

## 2024-03-29 RX ORDER — PHENAZOPYRIDINE HYDROCHLORIDE 100 MG/1
200 TABLET, FILM COATED ORAL ONCE
Status: COMPLETED | OUTPATIENT
Start: 2024-03-29 | End: 2024-03-29

## 2024-03-29 RX ORDER — ROCURONIUM BROMIDE 10 MG/ML
INJECTION, SOLUTION INTRAVENOUS AS NEEDED
Status: DISCONTINUED | OUTPATIENT
Start: 2024-03-29 | End: 2024-03-29

## 2024-03-29 RX ORDER — KETOROLAC TROMETHAMINE 30 MG/ML
INJECTION, SOLUTION INTRAMUSCULAR; INTRAVENOUS AS NEEDED
Status: DISCONTINUED | OUTPATIENT
Start: 2024-03-29 | End: 2024-03-29

## 2024-03-29 RX ORDER — POLYETHYLENE GLYCOL 3350 17 G/17G
17 POWDER, FOR SOLUTION ORAL DAILY
Qty: 60 EACH | Refills: 11 | Status: SHIPPED | OUTPATIENT
Start: 2024-03-29

## 2024-03-29 RX ORDER — CEFAZOLIN SODIUM 2 G/100ML
2 INJECTION, SOLUTION INTRAVENOUS ONCE
Status: DISCONTINUED | OUTPATIENT
Start: 2024-03-29 | End: 2024-03-29 | Stop reason: HOSPADM

## 2024-03-29 RX ORDER — IBUPROFEN 600 MG/1
600 TABLET ORAL EVERY 6 HOURS
Qty: 90 TABLET | Refills: 3 | Status: SHIPPED | OUTPATIENT
Start: 2024-03-29

## 2024-03-29 RX ORDER — HYDROMORPHONE HYDROCHLORIDE 1 MG/ML
INJECTION, SOLUTION INTRAMUSCULAR; INTRAVENOUS; SUBCUTANEOUS AS NEEDED
Status: DISCONTINUED | OUTPATIENT
Start: 2024-03-29 | End: 2024-03-29

## 2024-03-29 RX ORDER — DIPHENHYDRAMINE HYDROCHLORIDE 50 MG/ML
12.5 INJECTION INTRAMUSCULAR; INTRAVENOUS ONCE AS NEEDED
Status: DISCONTINUED | OUTPATIENT
Start: 2024-03-29 | End: 2024-03-29 | Stop reason: HOSPADM

## 2024-03-29 RX ORDER — OXYCODONE HYDROCHLORIDE 5 MG/1
5 TABLET ORAL EVERY 4 HOURS PRN
Status: DISCONTINUED | OUTPATIENT
Start: 2024-03-29 | End: 2024-03-29 | Stop reason: HOSPADM

## 2024-03-29 RX ORDER — LIDOCAINE HYDROCHLORIDE 10 MG/ML
0.1 INJECTION, SOLUTION EPIDURAL; INFILTRATION; INTRACAUDAL; PERINEURAL ONCE
Status: DISCONTINUED | OUTPATIENT
Start: 2024-03-29 | End: 2024-03-29 | Stop reason: HOSPADM

## 2024-03-29 RX ORDER — HEPARIN SODIUM 5000 [USP'U]/ML
5000 INJECTION, SOLUTION INTRAVENOUS; SUBCUTANEOUS ONCE
Status: COMPLETED | OUTPATIENT
Start: 2024-03-29 | End: 2024-03-29

## 2024-03-29 RX ORDER — BUPIVACAINE HYDROCHLORIDE 5 MG/ML
INJECTION, SOLUTION PERINEURAL AS NEEDED
Status: DISCONTINUED | OUTPATIENT
Start: 2024-03-29 | End: 2024-03-29 | Stop reason: HOSPADM

## 2024-03-29 RX ORDER — OXYCODONE HYDROCHLORIDE 5 MG/1
5 TABLET ORAL EVERY 6 HOURS PRN
Qty: 6 TABLET | Refills: 0 | Status: SHIPPED | OUTPATIENT
Start: 2024-03-29

## 2024-03-29 RX ORDER — CEFAZOLIN 1 G/1
INJECTION, POWDER, FOR SOLUTION INTRAVENOUS AS NEEDED
Status: DISCONTINUED | OUTPATIENT
Start: 2024-03-29 | End: 2024-03-29

## 2024-03-29 RX ORDER — ACETAMINOPHEN 325 MG/1
975 TABLET ORAL ONCE
Status: COMPLETED | OUTPATIENT
Start: 2024-03-29 | End: 2024-03-29

## 2024-03-29 RX ORDER — ONDANSETRON HYDROCHLORIDE 2 MG/ML
4 INJECTION, SOLUTION INTRAVENOUS ONCE AS NEEDED
Status: DISCONTINUED | OUTPATIENT
Start: 2024-03-29 | End: 2024-03-29 | Stop reason: HOSPADM

## 2024-03-29 RX ORDER — ONDANSETRON HYDROCHLORIDE 2 MG/ML
INJECTION, SOLUTION INTRAVENOUS AS NEEDED
Status: DISCONTINUED | OUTPATIENT
Start: 2024-03-29 | End: 2024-03-29

## 2024-03-29 RX ORDER — LABETALOL HYDROCHLORIDE 5 MG/ML
5 INJECTION, SOLUTION INTRAVENOUS ONCE AS NEEDED
Status: DISCONTINUED | OUTPATIENT
Start: 2024-03-29 | End: 2024-03-29 | Stop reason: HOSPADM

## 2024-03-29 RX ORDER — FENTANYL CITRATE 50 UG/ML
INJECTION, SOLUTION INTRAMUSCULAR; INTRAVENOUS AS NEEDED
Status: DISCONTINUED | OUTPATIENT
Start: 2024-03-29 | End: 2024-03-29

## 2024-03-29 RX ADMIN — PROPOFOL 40 MG: 10 INJECTION, EMULSION INTRAVENOUS at 13:28

## 2024-03-29 RX ADMIN — SUGAMMADEX 200 MG: 100 INJECTION, SOLUTION INTRAVENOUS at 16:06

## 2024-03-29 RX ADMIN — HEPARIN SODIUM 5000 UNITS: 5000 INJECTION INTRAVENOUS; SUBCUTANEOUS at 12:45

## 2024-03-29 RX ADMIN — PHENAZOPYRIDINE 200 MG: 100 TABLET ORAL at 20:33

## 2024-03-29 RX ADMIN — ACETAMINOPHEN 975 MG: 325 TABLET ORAL at 12:45

## 2024-03-29 RX ADMIN — ROCURONIUM BROMIDE 20 MG: 10 INJECTION, SOLUTION INTRAVENOUS at 15:39

## 2024-03-29 RX ADMIN — ROCURONIUM BROMIDE 20 MG: 10 INJECTION, SOLUTION INTRAVENOUS at 14:36

## 2024-03-29 RX ADMIN — MIDAZOLAM HYDROCHLORIDE 2 MG: 1 INJECTION, SOLUTION INTRAMUSCULAR; INTRAVENOUS at 12:58

## 2024-03-29 RX ADMIN — HYDROMORPHONE HYDROCHLORIDE 0.5 MG: 1 INJECTION, SOLUTION INTRAMUSCULAR; INTRAVENOUS; SUBCUTANEOUS at 16:22

## 2024-03-29 RX ADMIN — ROCURONIUM BROMIDE 20 MG: 10 INJECTION, SOLUTION INTRAVENOUS at 15:08

## 2024-03-29 RX ADMIN — KETOROLAC TROMETHAMINE 15 MG: 30 INJECTION, SOLUTION INTRAMUSCULAR at 16:11

## 2024-03-29 RX ADMIN — LIDOCAINE HYDROCHLORIDE 80 MG: 20 INJECTION, SOLUTION INFILTRATION; PERINEURAL at 13:02

## 2024-03-29 RX ADMIN — ROCURONIUM BROMIDE 20 MG: 10 INJECTION, SOLUTION INTRAVENOUS at 13:50

## 2024-03-29 RX ADMIN — ROCURONIUM BROMIDE 50 MG: 10 INJECTION, SOLUTION INTRAVENOUS at 13:02

## 2024-03-29 RX ADMIN — SODIUM CHLORIDE, POTASSIUM CHLORIDE, SODIUM LACTATE AND CALCIUM CHLORIDE 100 ML/HR: 600; 310; 30; 20 INJECTION, SOLUTION INTRAVENOUS at 12:50

## 2024-03-29 RX ADMIN — ONDANSETRON 4 MG: 2 INJECTION INTRAMUSCULAR; INTRAVENOUS at 16:07

## 2024-03-29 RX ADMIN — FENTANYL CITRATE 100 MCG: 50 INJECTION, SOLUTION INTRAMUSCULAR; INTRAVENOUS at 13:02

## 2024-03-29 RX ADMIN — PHENAZOPYRIDINE 200 MG: 100 TABLET ORAL at 12:45

## 2024-03-29 RX ADMIN — CELECOXIB 400 MG: 200 CAPSULE ORAL at 12:45

## 2024-03-29 RX ADMIN — DEXAMETHASONE SODIUM PHOSPHATE 4 MG: 4 INJECTION, SOLUTION INTRA-ARTICULAR; INTRALESIONAL; INTRAMUSCULAR; INTRAVENOUS; SOFT TISSUE at 13:18

## 2024-03-29 RX ADMIN — HYDROMORPHONE HYDROCHLORIDE 0.5 MG: 1 INJECTION, SOLUTION INTRAMUSCULAR; INTRAVENOUS; SUBCUTANEOUS at 16:04

## 2024-03-29 RX ADMIN — CEFAZOLIN 2 G: 330 INJECTION, POWDER, FOR SOLUTION INTRAMUSCULAR; INTRAVENOUS at 13:08

## 2024-03-29 RX ADMIN — PROPOFOL 160 MG: 10 INJECTION, EMULSION INTRAVENOUS at 13:02

## 2024-03-29 RX ADMIN — SODIUM CHLORIDE, POTASSIUM CHLORIDE, SODIUM LACTATE AND CALCIUM CHLORIDE: 600; 310; 30; 20 INJECTION, SOLUTION INTRAVENOUS at 13:26

## 2024-03-29 ASSESSMENT — PAIN - FUNCTIONAL ASSESSMENT

## 2024-03-29 ASSESSMENT — PAIN SCALES - GENERAL

## 2024-03-29 ASSESSMENT — COLUMBIA-SUICIDE SEVERITY RATING SCALE - C-SSRS
1. IN THE PAST MONTH, HAVE YOU WISHED YOU WERE DEAD OR WISHED YOU COULD GO TO SLEEP AND NOT WAKE UP?: NO
6. HAVE YOU EVER DONE ANYTHING, STARTED TO DO ANYTHING, OR PREPARED TO DO ANYTHING TO END YOUR LIFE?: NO
2. HAVE YOU ACTUALLY HAD ANY THOUGHTS OF KILLING YOURSELF?: NO

## 2024-03-29 NOTE — ANESTHESIA PREPROCEDURE EVALUATION
Patient: Maci Terry    Procedure Information       Date/Time: 03/29/24 1130    Procedures:       Robot Assisted Sacrocolpopexy; Posterior Repair; Possible Perineorrhaphy      Cystoscopy    Location: [facility] OR 08 / [facility] OR    Surgeons: Ana Morales MD MPH            Relevant Problems   Endocrine   (+) Hypothyroidism       Clinical information reviewed:   Tobacco  Allergies  Meds   Med Hx  Surg Hx   Fam Hx  Soc Hx        NPO Detail:  NPO/Void Status  Date of Last Liquid: 03/28/24  Date of Last Solid: 03/28/24         Physical Exam    Airway  Mallampati: II  TM distance: >3 FB  Neck ROM: full     Cardiovascular   Rhythm: regular  Rate: normal     Dental    Pulmonary   Breath sounds clear to auscultation     Abdominal            Anesthesia Plan    History of general anesthesia?: yes  History of complications of general anesthesia?: no    ASA 3     general     intravenous induction   Anesthetic plan and risks discussed with patient.    Plan discussed with CRNA.

## 2024-03-29 NOTE — DISCHARGE INSTRUCTIONS
Urogynecology Post Operative instructions  Clinic number: [phone]    Call your physicians office or go to the nearest emergency room if you have any of the following:   · Fever higher than 100.4 F, chills, sweats.   · Redness, tenderness, increased swelling or drainage at incision.   · Difficulty swallowing or eating.  · Nausea or vomiting.  · Increased pain or pain that is not controlled.   · Increased cough or productive cough of yellow or green colored phlegm.   · Vaginal bleeding soaking more than a pad/hour.  · Any other symptoms that are concerning to you  · Go IMMEDIATELY to the emergency department if you experience shortness of breath    Medications:     For Pain:   · Tylenol (acetaminophen) and ibuprofen are your first line therapies for pain. You can take each of these medications every 6 (six) hours. You can alternate taking doses of these medications so that you have a pain medication available to take every 3 hours. For example, you can take Tylenol at 9am, then ibuprofen at noon, then Tylenol again at 3 pm, etc.  · You have been given a prescription for a narcotic pain medication, oxycodone, to help with postoperative discomfort.  You can take this medication if you have taken Tylenol and ibuprofen and your pain is still greater than a 4 out of 10. The best way to wean off of narcotic pain medications is by alternating them with Ibuprofen and by slowly lengthening the time between your doses of narcotic pain medication.    · You may also try a heating pad to help relieve your pain. Be certain to protect your skin by placing a towel between you and the heating pad. DO NOT fall asleep with the heating pad in place.     For Constipation:   · Narcotic pain medications cause constipation, so you should take Miralax, once or twice daily as long as you are taking narcotic pain medication.    · If no bowel movement in 48 hours, try Milk of Magnesia.   · Drink 6 glasses of water per day.     Wound Care:    As you heal, your incision will look better and the soreness will go away. You may also feel numbness or a “pins and needle” sensation in the area of your incision.   · You may shower once you return home. Wash the incision(s) gently with soap and water during your regular shower and pat dry with a clean towel.   · DO NOT take a bath or submerge your incision in water (NO swimming or hot tubs) for three (3) weeks.   · Do not put any ointments or creams on your incision for 3 to 4 weeks (they can hinder healing).   · Some vaginal bleeding is normal after vaginal surgery. If you are soaking pads in 1-2 hours, please call the office.       It is very important to keep all of your post operative clinic appointments.

## 2024-03-29 NOTE — ANESTHESIA PROCEDURE NOTES
Airway  Date/Time: 3/29/2024 1:07 PM  Urgency: elective      Staffing  Performed: GRETTA   Authorized by: Reza Garcia MD    Performed by: JOHN Mcallister  Patient location during procedure: OR    Indications and Patient Condition  Indications for airway management: anesthesia and airway protection  Spontaneous Ventilation: absent  Sedation level: deep  Preoxygenated: yes  Patient position: sniffing  Mask difficulty assessment: 1 - vent by mask  No planned trial extubation    Final Airway Details  Final airway type: endotracheal airway      Successful airway: ETT  Cuffed: yes   Successful intubation technique: direct laryngoscopy  Facilitating devices/methods: intubating stylet and cricoid pressure  Endotracheal tube insertion site: oral  Blade: Taran  Blade size: #3  ETT size (mm): 7.0  Cormack-Lehane Classification: grade I - full view of glottis  Placement verified by: chest auscultation, bronchoscopy and capnometry   Measured from: lips  ETT to lips (cm): 22

## 2024-03-29 NOTE — H&P
Preoperative History and Physical    67 yo presenting for Robot Assisted Sacrocolpopexy; Posterior Repair; Possible Perineorrhaphy for  Vaginal vault prolapse, recurrent s/p x3 previous repairs,     Patient also with OAB/UUI, Fecal incontinence, Urinary retention     Labs (3/15): TSH 2.6, Cr 0.63, Hgb 12.7, MRSA neg     Medhx: hypothyroidism, bmi 33, recurrent UTIs vs colonization, OAB, fecal incontinence     Surghx: Prior cystocele repair/MMK in 1981. She then had a hysterectomy with another prolapse repair (described as APR) in [facility] in 1995. She then had another POP repair (described as an APR/suspension) in [place] in 2008. The patient denies any of her prolapse repairs included mesh.  . No mesh. Lap Cholecystectomy,  Meds: premerin, ibuprofen, trazadone  All : Doxy (hives)      Past Medical History  Past Medical History:   Diagnosis Date    Arthritis     Incontinence in female     Insomnia     Pes anserinus bursitis of right knee     POP-Q stage 2 rectocele     Spinal headache     Vaginal vault prolapse after hysterectomy     Vitamin D deficiency         Past Surgical History   Past Surgical History:   Procedure Laterality Date    CHOLECYSTECTOMY      CYSTOCELE REPAIR      1970s    HYSTERECTOMY      with prolapse repair    KNEE CARTILAGE SURGERY Right 07/2023    OTHER SURGICAL HISTORY  2008    bladder and rectal prolapse repair    TUBAL LIGATION         Social History  Social History     Tobacco Use    Smoking status: Never     Passive exposure: Never    Smokeless tobacco: Never   Substance Use Topics    Alcohol use: Not Currently     Substance and Sexual Activity   Drug Use Never       Allergies  Doxycycline     Medications  No medications prior to admission.       Objective    Last Vitals  Temp Pulse Resp BP MAP O2 Sat                   Physical Examination  Gen: NAD  HEENT: NCAT  Resp: normal work of breathing on room air  Card: regular rate  Neuro: grossly intact   Psych: approipriate  Motor: moving all  extremities spontaneously   Abdomen: Non distended      Lab Review  Labs in chart were reviewed.

## 2024-03-29 NOTE — OP NOTE
Robot Assisted Sacrocolpopexy; Posterior Repair, Perineorrhaphy, Cystoscopy Operative Note     Date: 3/29/2024  OR Location: [facility] A OR    Name: Maci Terry, : 1955, Age: 68 y.o., MRN: [id number], Sex: female    Diagnosis  Pre-op Diagnosis     * Vaginal vault prolapse after hysterectomy [N99.3]     * POP-Q stage 2 rectocele [N81.6] Post-op Diagnosis     * Vaginal vault prolapse after hysterectomy [N99.3]     * POP-Q stage 2 rectocele [N81.6]     Procedures  Robot Assisted Sacrocolpopexy; Posterior Repair;  03520 - NV LAPAROSCOPY COLPOPEXY SUSPENSION VAGINAL APEX    Cystoscopy  56091 - NV CYSTOURETHROSCOPY    NV POST COLPORRHAPHY RECTOCELE W/WO PERINEORRHAPHY [33624]  Surgeons      * Ana Morales - Primary    Resident/Fellow/Other Assistant:  Surgeon(s) and Role: Shari Mcwilliams    Procedure Summary  Anesthesia: General  ASA: III  Anesthesia Staff: Anesthesiologist: Reza Garcia MD; Santiago Javier MD  CRNA: TAWNY Flynn-CRNA  C-AA: JOHN Lebron; JOHN Mcallister  GRETTA: Jason Lea  Estimated Blood Loss: 100mL  Intra-op Medications:   Administrations occurring from 1130 to 1530 on 24:   Medication Name Total Dose   lactated Ringer's infusion 266.67 mL   acetaminophen (Tylenol) tablet 975 mg 975 mg   celecoxib (CeleBREX) capsule 400 mg 400 mg   heparin (porcine) injection 5,000 Units 5,000 Units   phenazopyridine (Pyridium) tablet 200 mg 200 mg              Anesthesia Record               Intraprocedure I/O Totals       None           Specimen: No specimens collected     Staff:   Circulator: Marcella Gordillo RN; Nicole Kenny RN  Relief Scrub: Kaelyn Lerner  Scrub Person: Angelique Reyes; Nadia Monae; Enriqueta Merino         Drains and/or Catheters:   Urethral Catheter 16 Fr. (Active)       Implants:  Polypropylene mesh     Findings:   EUA: TVH ~6cm with narrowing at apex c/w prior vaginal surgeries    LSC: minimal filmy adhesions between bladder  and vaginal cuff; very thin vaginal tissue (small button-hole repaired with 2-0 vicryl laparoscopic), normal/ atrophic bilateral ovaries and remnant of fallopian tubes    Cysto: bilateral ureteral spill, no sutures     Vaginal exam at end of procedure: PDS suture visualized but hemostatic, all components well-supported at the end of the procedure    Indications: Maci Terry is an 68 y.o. female with a history of recurrent POP.  Prior cystocele repair/MMK in 1981. She then had a hysterectomy with another prolapse repair (described as APR) in [facility] in 1995. She then had another POP repair (described as an APR/suspension) in [place] in 2008. The patient denies any of her prolapse repairs included mesh.     She presents for sacrocolpopexy,    The patient was seen in the preoperative area. The risks, benefits, complications, treatment options, non-operative alternatives, expected recovery and outcomes were discussed with the patient. The possibilities of reaction to medication, pulmonary aspiration, injury to surrounding structures, bleeding, recurrent infection, the need for additional procedures, failure to diagnose a condition, and creating a complication requiring transfusion or operation were discussed with the patient. The patient concurred with the proposed plan, giving informed consent.  The site of surgery was properly noted/marked if necessary per policy. The patient has been actively warmed in preoperative area. Preoperative antibiotics have been ordered and given within 1 hours of incision. Venous thrombosis prophylaxis  SQH, SCDs    Procedure Details:   A time out was taken verifying patient name, position, and procedure to be performed.   General anesthesia was administered.  The patient was prepped and draped in the normal sterile fashion in yellow fin stirrups.      A Veress needle was placed through an incision in the umbilicus.  Pneumonperitoneum was established with CO2 gas.  An 8mm port was placed  through the umbilical incision and intraperitoneal placement was confirmed.  Under direct visualization, 8mm Davinici port trocars were placed a few centimeters medial and superior to the anterior iliac spine. An 12mm accessory port was placed on the right midway between the camera and the Davinci port.  The Davinci robotic arms were then attached to the laparoscopic ports already placed.      The sigmoid colon was retracted laterally to identify the sacral promontory, iliac vessels and right ureter.  Redundancy of sigmoid colon noted but able to retract for adequate visualization.  The filmy adhesions taken down sharply between bladder and vaginal apex.  The peritoneum over the sacrum was opened with cautery and the dissection was carried to the longitudinal ligament.      The bladder and rectum and their relationships to the apex of the vagina were identified.  The bladder and its peritoneal reflections were dissected free of the anterior vaginal wall and the peritoneum of the posterior vaginal wall was also dissected free.  The posterior vaginal wall was very thin and small ~2mm incision into vagina made which was closed with 2-0 vicryl.  Polypropylene Y-mesh was secured to the anterior vaginal wall with 6 interrupted 0 PDS  sutures.  5  interrupted 0 PDS sutures were placed to secure the mesh to the posterior vaginal wall.   The vagina was then elevated.  Goretex sutures were placed through the anterior longitudinal ligament without complications.  These were then tied down to the sacrum with good elevation of the vaginal vault.  Hemostasis was assured.  The peritoneum was reapproximated over the mesh with 2-0 Vicryl covering the entirety of the mesh.     The Davinci robotic arms were undocked.  The ports were removed under direct vision.  Using the Gus Hernandez needle  and an endoclose needle, the 12mm port was closed with 2-0 interrupted Vicryl suture.  The skin was closed with 4-0 Vicryl  interrupted sutures and dermabond.    Cystoscopy was done, confirming normal bladder without evidence of injury and efflux of urine from each UO was seen.  Isaac replaced.     Attention turned to posterior repair. The posterior repair margins were then outlined and injected with 1% lidocaine with epinephrine. A superficial incision was made through the outline with a knife. The posterior vaginal wall was excised using a knife. Once the dissection was completed, the rectovaginal tissue and perineal muscular tissue was plicated in the midline with a series of U stitches until the dead space was completely closed using 2-0 PDS. A bulbocavernosus stitch was performed using 2-0 PDS. The vaginal mucosa was then re-approximated with 2-0 Vicryl until the wound was completely closed.     The patient was then awakened from anesthesia, having tolerated procedure well, and was taken to the recovery room in stable condition. All sponge, needle, and instrument counts were correct at the end the case.    Dr. Morales was present and scrubbed for entire procedure.   .    Complications:  None; patient tolerated the procedure well.    Disposition: PACU - hemodynamically stable.  Condition: stable       Attending Attestation:     I was present and scrubbed for the entire procedure.         Ana Morales  Phone Number: [phone]

## 2024-03-31 ENCOUNTER — TELEPHONE (OUTPATIENT)
Dept: OBSTETRICS AND GYNECOLOGY | Facility: CLINIC | Age: 69
End: 2024-03-31
Payer: MEDICARE

## 2024-03-31 NOTE — TELEPHONE ENCOUNTER
I spoke with patient on 3/30 and she stated that she was doing well. Had no complaints. States that her sister is an RN and is taking care of her. I called her back today. She states that she is doing well. She has not had a bowel movement yet and is taking the miralax. I told her if no bowel movement today, she may want to start milk of magnesia. She agrees.

## 2024-04-01 ASSESSMENT — PAIN SCALES - GENERAL: PAINLEVEL_OUTOF10: 0 - NO PAIN

## 2024-04-01 NOTE — ANESTHESIA POSTPROCEDURE EVALUATION
Patient: Maci Terry    Procedure Summary       Date: 03/29/24 Room / Location: [facility] OR 08 / Virtual [facility] OR    Anesthesia Start: 1250 Anesthesia Stop: 1655    Procedures:       Robot Assisted Sacrocolpopexy; Posterior Repair;      Cystoscopy Diagnosis:       Vaginal vault prolapse after hysterectomy      POP-Q stage 2 rectocele      (Vaginal vault prolapse after hysterectomy [N99.3])      (POP-Q stage 2 rectocele [N81.6])    Surgeons: Ana Morales MD M[facility] Responsible Provider: Santiago Javier MD    Anesthesia Type: general ASA Status: 3            Anesthesia Type: general    Vitals Value Taken Time   /76 03/29/24 2014   Temp 36.6 °C (97.9 °F) 03/29/24 1700   Pulse 91 03/29/24 2014   Resp 20 03/29/24 2014   SpO2 98 % 03/29/24 2014       Anesthesia Post Evaluation    Patient location during evaluation: PACU  Patient participation: complete - patient participated  Level of consciousness: awake and alert  Pain management: adequate  Airway patency: patent  Cardiovascular status: acceptable and hemodynamically stable  Respiratory status: acceptable, spontaneous ventilation and nonlabored ventilation  Hydration status: acceptable  Postoperative Nausea and Vomiting: none        There were no known notable events for this encounter.

## 2024-08-06 ENCOUNTER — APPOINTMENT (OUTPATIENT)
Dept: ORTHOPEDIC SURGERY | Facility: CLINIC | Age: 69
End: 2024-08-06
Payer: MEDICARE

## 2024-08-06 DIAGNOSIS — M25.561 RIGHT KNEE PAIN, UNSPECIFIED CHRONICITY: Primary | ICD-10-CM

## 2024-08-13 ENCOUNTER — HOSPITAL ENCOUNTER (OUTPATIENT)
Dept: RADIOLOGY | Facility: HOSPITAL | Age: 69
Discharge: HOME | End: 2024-08-13
Payer: MEDICARE

## 2024-08-13 DIAGNOSIS — M25.562 PAIN IN LEFT KNEE: ICD-10-CM

## 2024-08-13 PROCEDURE — 73721 MRI JNT OF LWR EXTRE W/O DYE: CPT | Mod: LEFT SIDE | Performed by: RADIOLOGY

## 2024-08-13 PROCEDURE — 73721 MRI JNT OF LWR EXTRE W/O DYE: CPT | Mod: LT

## 2024-08-23 ENCOUNTER — HOSPITAL ENCOUNTER (OUTPATIENT)
Dept: RADIOLOGY | Facility: HOSPITAL | Age: 69
Discharge: HOME | End: 2024-08-23
Payer: MEDICARE

## 2024-08-23 DIAGNOSIS — M17.11 UNILATERAL PRIMARY OSTEOARTHRITIS, RIGHT KNEE: ICD-10-CM

## 2024-08-23 PROCEDURE — 73721 MRI JNT OF LWR EXTRE W/O DYE: CPT | Mod: RT

## 2024-08-30 ENCOUNTER — HOSPITAL ENCOUNTER (OUTPATIENT)
Dept: RADIOLOGY | Facility: HOSPITAL | Age: 69
Discharge: HOME | End: 2024-08-30
Payer: MEDICARE

## 2024-08-30 ENCOUNTER — LAB (OUTPATIENT)
Dept: LAB | Facility: LAB | Age: 69
End: 2024-08-30
Payer: MEDICARE

## 2024-08-30 ENCOUNTER — HOSPITAL ENCOUNTER (OUTPATIENT)
Dept: CARDIOLOGY | Facility: HOSPITAL | Age: 69
Discharge: HOME | End: 2024-08-30
Payer: MEDICARE

## 2024-08-30 ENCOUNTER — OFFICE VISIT (OUTPATIENT)
Dept: PRIMARY CARE | Facility: CLINIC | Age: 69
End: 2024-08-30
Payer: MEDICARE

## 2024-08-30 VITALS
BODY MASS INDEX: 33.84 KG/M2 | HEIGHT: 63 IN | DIASTOLIC BLOOD PRESSURE: 74 MMHG | HEART RATE: 77 BPM | WEIGHT: 191 LBS | SYSTOLIC BLOOD PRESSURE: 157 MMHG

## 2024-08-30 DIAGNOSIS — R06.02 SHORTNESS OF BREATH: ICD-10-CM

## 2024-08-30 DIAGNOSIS — R06.02 SHORTNESS OF BREATH: Primary | ICD-10-CM

## 2024-08-30 LAB
ALBUMIN SERPL BCP-MCNC: 4.1 G/DL (ref 3.4–5)
ALP SERPL-CCNC: 100 U/L (ref 33–136)
ALT SERPL W P-5'-P-CCNC: 12 U/L (ref 7–45)
ANION GAP SERPL CALC-SCNC: 12 MMOL/L (ref 10–20)
AST SERPL W P-5'-P-CCNC: 13 U/L (ref 9–39)
ATRIAL RATE: 71 BPM
BASOPHILS # BLD AUTO: 0.05 X10*3/UL (ref 0–0.1)
BASOPHILS NFR BLD AUTO: 0.8 %
BILIRUB SERPL-MCNC: 0.7 MG/DL (ref 0–1.2)
BNP SERPL-MCNC: 635 PG/ML (ref 0–99)
BUN SERPL-MCNC: 13 MG/DL (ref 6–23)
CALCIUM SERPL-MCNC: 8.8 MG/DL (ref 8.6–10.3)
CARDIAC TROPONIN I PNL SERPL HS: 29 NG/L (ref 0–13)
CHLORIDE SERPL-SCNC: 103 MMOL/L (ref 98–107)
CO2 SERPL-SCNC: 29 MMOL/L (ref 21–32)
CREAT SERPL-MCNC: 0.67 MG/DL (ref 0.5–1.05)
D DIMER PPP FEU-MCNC: 487 NG/ML FEU
EGFRCR SERPLBLD CKD-EPI 2021: >90 ML/MIN/1.73M*2
EOSINOPHIL # BLD AUTO: 0.08 X10*3/UL (ref 0–0.7)
EOSINOPHIL NFR BLD AUTO: 1.3 %
ERYTHROCYTE [DISTWIDTH] IN BLOOD BY AUTOMATED COUNT: 13.2 % (ref 11.5–14.5)
GLUCOSE SERPL-MCNC: 88 MG/DL (ref 74–99)
HCT VFR BLD AUTO: 37.5 % (ref 36–46)
HGB BLD-MCNC: 11.8 G/DL (ref 12–16)
IMM GRANULOCYTES # BLD AUTO: 0.02 X10*3/UL (ref 0–0.7)
IMM GRANULOCYTES NFR BLD AUTO: 0.3 % (ref 0–0.9)
LYMPHOCYTES # BLD AUTO: 1.53 X10*3/UL (ref 1.2–4.8)
LYMPHOCYTES NFR BLD AUTO: 24.4 %
MCH RBC QN AUTO: 27.8 PG (ref 26–34)
MCHC RBC AUTO-ENTMCNC: 31.5 G/DL (ref 32–36)
MCV RBC AUTO: 88 FL (ref 80–100)
MONOCYTES # BLD AUTO: 0.61 X10*3/UL (ref 0.1–1)
MONOCYTES NFR BLD AUTO: 9.7 %
NEUTROPHILS # BLD AUTO: 3.98 X10*3/UL (ref 1.2–7.7)
NEUTROPHILS NFR BLD AUTO: 63.5 %
NRBC BLD-RTO: 0 /100 WBCS (ref 0–0)
P AXIS: 67 DEGREES
P OFFSET: 179 MS
P ONSET: 118 MS
PLATELET # BLD AUTO: 305 X10*3/UL (ref 150–450)
POTASSIUM SERPL-SCNC: 3.7 MMOL/L (ref 3.5–5.3)
PR INTERVAL: 190 MS
PROT SERPL-MCNC: 6.3 G/DL (ref 6.4–8.2)
Q ONSET: 213 MS
QRS COUNT: 11 BEATS
QRS DURATION: 86 MS
QT INTERVAL: 426 MS
QTC CALCULATION(BAZETT): 462 MS
QTC FREDERICIA: 450 MS
R AXIS: 78 DEGREES
RBC # BLD AUTO: 4.25 X10*6/UL (ref 4–5.2)
SODIUM SERPL-SCNC: 140 MMOL/L (ref 136–145)
T AXIS: 253 DEGREES
T OFFSET: 426 MS
VENTRICULAR RATE: 71 BPM
WBC # BLD AUTO: 6.3 X10*3/UL (ref 4.4–11.3)

## 2024-08-30 PROCEDURE — 3008F BODY MASS INDEX DOCD: CPT | Performed by: FAMILY MEDICINE

## 2024-08-30 PROCEDURE — 1159F MED LIST DOCD IN RCRD: CPT | Performed by: FAMILY MEDICINE

## 2024-08-30 PROCEDURE — 1036F TOBACCO NON-USER: CPT | Performed by: FAMILY MEDICINE

## 2024-08-30 PROCEDURE — 83880 ASSAY OF NATRIURETIC PEPTIDE: CPT

## 2024-08-30 PROCEDURE — 1123F ACP DISCUSS/DSCN MKR DOCD: CPT | Performed by: FAMILY MEDICINE

## 2024-08-30 PROCEDURE — 85025 COMPLETE CBC W/AUTO DIFF WBC: CPT

## 2024-08-30 PROCEDURE — 71046 X-RAY EXAM CHEST 2 VIEWS: CPT | Performed by: RADIOLOGY

## 2024-08-30 PROCEDURE — 84484 ASSAY OF TROPONIN QUANT: CPT

## 2024-08-30 PROCEDURE — 80053 COMPREHEN METABOLIC PANEL: CPT

## 2024-08-30 PROCEDURE — 93005 ELECTROCARDIOGRAM TRACING: CPT

## 2024-08-30 PROCEDURE — 36415 COLL VENOUS BLD VENIPUNCTURE: CPT

## 2024-08-30 PROCEDURE — 71046 X-RAY EXAM CHEST 2 VIEWS: CPT

## 2024-08-30 PROCEDURE — 99214 OFFICE O/P EST MOD 30 MIN: CPT | Performed by: FAMILY MEDICINE

## 2024-08-30 PROCEDURE — 85379 FIBRIN DEGRADATION QUANT: CPT

## 2024-08-30 RX ORDER — FUROSEMIDE 20 MG/1
20 TABLET ORAL DAILY
Qty: 30 TABLET | Refills: 11 | Status: SHIPPED | OUTPATIENT
Start: 2024-08-30 | End: 2025-08-30

## 2024-08-30 RX ORDER — PREDNISONE 20 MG/1
40 TABLET ORAL DAILY
Qty: 10 TABLET | Refills: 0 | Status: SHIPPED | OUTPATIENT
Start: 2024-08-30 | End: 2024-09-04

## 2024-08-30 RX ORDER — AMOXICILLIN AND CLAVULANATE POTASSIUM 875; 125 MG/1; MG/1
875 TABLET, FILM COATED ORAL 2 TIMES DAILY
Qty: 20 TABLET | Refills: 0 | Status: SHIPPED | OUTPATIENT
Start: 2024-08-30 | End: 2024-09-09

## 2024-08-30 RX ORDER — ALBUTEROL SULFATE 90 UG/1
2 INHALANT RESPIRATORY (INHALATION) EVERY 4 HOURS PRN
Qty: 8 G | Refills: 0 | Status: SHIPPED | OUTPATIENT
Start: 2024-08-30 | End: 2025-08-30

## 2024-08-30 NOTE — PROGRESS NOTES
"Subjective   Patient ID: Maci Terry is a 69 y.o. female who presents for Sick Visit (B/l foot and ankle swelling, having surgery on 9/5, she's also having SOB and horrible cough in morning, feels like when she had pneumonia).    Shortness of breath: and cough started 3 days ago along with leg swelling. No sore throat. No body aches. She had some chest tightness about 3 days ago. She mentioned this to her sister. Cough is non-productive. She is getting some tinnitus in her ears when she lies down on her side. No chills. No vomiting or diarrhea.     Recurrent UTI: Following with urogyn. Planning for bladder lift on 3/29.      Right knee pain: Getting gucosamine injections with Teri Blake but considering getting replacements done within the next year.      Feet tingling, numbness: Going on for 6 or 8 months. Mostly on the soles of her feet. No pain. She does have a sister with neuropathy.      Hypothyroidism: off meds for about 4 months, completed labs last month.      Insomnia: Ambien stopped working. Hung over on trazodone. Doing ok without meds at this point.      All other systems reviewed and negative for complaint unless stated above.    Objective   /74 (BP Location: Right arm, Patient Position: Sitting, BP Cuff Size: Adult)   Pulse 77   Ht 1.6 m (5' 3\")   Wt 86.6 kg (191 lb)   BMI 33.83 kg/m²     Gen: No acute distress, alert and oriented x3, pleasant   HEENT: moist mucous membranes, b/l external auditory canals are clear of debris, TMs within normal limits, no oropharyngeal lesions, eomi, perrla   Neck: thyroid within normal limits, no lymphadenopathy   CV: RRR, normal S1/S2, no murmur   Resp: Clear to auscultation bilaterally, no wheezes or rhonchi appreciated, dyspnea on exertion, conversational dyspnea, there is a gurgling with speech  Abd: soft, nontender, non-distended, no guarding/rigidity, bowel sounds present  Extr: moderate B/L LE edema, no calf tenderness  Derm: Skin is warm and dry, no " rashes appreciated  Psych: mood is good, affect is congruent, good hygiene, normal speech and eye contact  Neuro: cranial nerves grossly intact, normal gait    Assessment/Plan     #Shortness of breath:  Check EKG, CXR, and stat labs  Rx sent augmentin  Rx sent prednisone burst  Rx sent albuterol     #B/L LE edema  Sent lasix awaiting stat labs    #Knee pain   Following with a PA but looking into establishing with new ortho     #Incontinence  Following with Dr. Morales  Scheduled for bladder lift on 3/29     #Hot flashes  Doing well on premarin     #Dyslipidemia   Diet controlled     #Hypothyroidism  doing well off of meds     #Insomnia   Ambien stopped working for her  She had fogginess on the trazodone     #Vitamin D   On replacement     HCM:  UTD for flu and PNA vaccines  C-scope 2022 with [facility], next due 2032  Mammogram Dec negative

## 2024-09-03 ENCOUNTER — TELEPHONE (OUTPATIENT)
Dept: PRIMARY CARE | Facility: CLINIC | Age: 69
End: 2024-09-03
Payer: MEDICARE

## 2024-09-03 ENCOUNTER — APPOINTMENT (OUTPATIENT)
Dept: CARDIOLOGY | Facility: HOSPITAL | Age: 69
End: 2024-09-03
Payer: MEDICARE

## 2024-09-03 NOTE — TELEPHONE ENCOUNTER
Maci called in and wanted to thank you for recommending her to go to the ER over the weekend. She stated that you were spot on about her results.

## 2024-09-04 ENCOUNTER — PATIENT OUTREACH (OUTPATIENT)
Dept: PRIMARY CARE | Facility: CLINIC | Age: 69
End: 2024-09-04
Payer: MEDICARE

## 2024-09-04 RX ORDER — METOPROLOL SUCCINATE 25 MG/1
25 TABLET, EXTENDED RELEASE ORAL
COMMUNITY
Start: 2024-09-04 | End: 2024-10-04

## 2024-09-04 RX ORDER — DEXTROMETHORPHAN POLISTIREX 30 MG/5ML
60 SUSPENSION ORAL 2 TIMES DAILY
COMMUNITY
Start: 2024-09-03

## 2024-09-04 RX ORDER — ASPIRIN 81 MG/1
81 TABLET ORAL
COMMUNITY
Start: 2024-09-04 | End: 2024-10-04

## 2024-09-04 RX ORDER — ROSUVASTATIN CALCIUM 20 MG/1
20 TABLET, COATED ORAL
COMMUNITY
Start: 2024-09-04 | End: 2024-10-04

## 2024-09-04 RX ORDER — LOSARTAN POTASSIUM 25 MG/1
25 TABLET ORAL
COMMUNITY
Start: 2024-09-04 | End: 2024-10-04

## 2024-09-04 NOTE — PROGRESS NOTES
Discharge Facility: [facility]   Discharge Diagnosis: SOB HYPOKALEMIA   Admission Date: 8-30-24  Discharge Date: 9-3-24     PCP Appointment Date: 9-23-24   Specialist Appointment Date: Pt. To schedule   Hospital Encounter and Summary Linked: Yes  See discharge assessment below for further details      Pt. Is waiting on a call from [facility] to return to the hospital for hearth cath. The office stated they did not have a space  until October, but that is too far out so they are trying to find a solution and reach back out to the patient. She is very thankful to Dr. Cason.     Medications  Medications reviewed with patient/caregiver?: Yes (9/4/2024 11:24 AM)  Is the patient having any side effects they believe may be caused by any medication additions or changes?: No (9/4/2024 11:24 AM)  Does the patient have all medications ordered at discharge?: Yes (9/4/2024 11:24 AM)  Care Management Interventions: No intervention needed (9/4/2024 11:24 AM)  Is the patient taking all medications as directed (includes completed medication regime)?: Yes (9/4/2024 11:24 AM)  Care Management Interventions: Provided patient education (9/4/2024 11:24 AM)    Appointments  Does the patient have a primary care provider?: Yes (9/4/2024 11:24 AM)  Care Management Interventions: Verified appointment date/time/provider (9/4/2024 11:24 AM)  Has the patient kept scheduled appointments due by today?: Yes (9/4/2024 11:24 AM)  Care Management Interventions: Advised patient to keep appointment (9/4/2024 11:24 AM)    Self Management  Has home health visited the patient within 72 hours of discharge?: Not applicable (9/4/2024 11:24 AM)    Patient Teaching  Does the patient have access to their discharge instructions?: Yes (9/4/2024 11:24 AM)  Care Management Interventions: Reviewed instructions with patient (9/4/2024 11:24 AM)  What is the patient's perception of their health status since discharge?: Same (9/4/2024 11:24 AM)  Is the patient/caregiver able to  teach back the hierarchy of who to call/visit for symptoms/problems? PCP, Specialist, Home Health nurse, Urgent Care, ED, 911: Yes (9/4/2024 11:24 AM)      Maria M Silver LPN

## 2024-09-05 ENCOUNTER — APPOINTMENT (OUTPATIENT)
Dept: CARDIOLOGY | Facility: HOSPITAL | Age: 69
End: 2024-09-05
Payer: MEDICARE

## 2024-09-06 ENCOUNTER — TELEPHONE (OUTPATIENT)
Dept: CARDIOLOGY | Facility: CLINIC | Age: 69
End: 2024-09-06
Payer: MEDICARE

## 2024-09-06 NOTE — TELEPHONE ENCOUNTER
When called to confirm appointment earlier today, patient stated she thought appointment was for a LHC. Notes reviewed and TCT patient to explain plan for NP visit Tuesday and then schedule procedure if needed. States understanding and agreement.

## 2024-09-10 ENCOUNTER — OFFICE VISIT (OUTPATIENT)
Dept: CARDIOLOGY | Facility: CLINIC | Age: 69
End: 2024-09-10
Payer: MEDICARE

## 2024-09-10 ENCOUNTER — APPOINTMENT (OUTPATIENT)
Dept: CARDIOLOGY | Facility: CLINIC | Age: 69
End: 2024-09-10
Payer: MEDICARE

## 2024-09-10 VITALS
DIASTOLIC BLOOD PRESSURE: 77 MMHG | WEIGHT: 185.9 LBS | OXYGEN SATURATION: 94 % | SYSTOLIC BLOOD PRESSURE: 126 MMHG | HEIGHT: 63 IN | HEART RATE: 77 BPM | BODY MASS INDEX: 32.94 KG/M2

## 2024-09-10 DIAGNOSIS — I50.30 (HFPEF) HEART FAILURE WITH PRESERVED EJECTION FRACTION (MULTI): ICD-10-CM

## 2024-09-10 DIAGNOSIS — R94.39 ABNORMAL STRESS TEST: ICD-10-CM

## 2024-09-10 DIAGNOSIS — R06.09 DOE (DYSPNEA ON EXERTION): Primary | ICD-10-CM

## 2024-09-10 PROCEDURE — 99205 OFFICE O/P NEW HI 60 MIN: CPT | Performed by: HOSPITALIST

## 2024-09-10 PROCEDURE — 1159F MED LIST DOCD IN RCRD: CPT | Performed by: HOSPITALIST

## 2024-09-10 PROCEDURE — 99215 OFFICE O/P EST HI 40 MIN: CPT | Performed by: HOSPITALIST

## 2024-09-10 PROCEDURE — 3008F BODY MASS INDEX DOCD: CPT | Performed by: HOSPITALIST

## 2024-09-10 PROCEDURE — 1123F ACP DISCUSS/DSCN MKR DOCD: CPT | Performed by: HOSPITALIST

## 2024-09-10 PROCEDURE — 1036F TOBACCO NON-USER: CPT | Performed by: HOSPITALIST

## 2024-09-10 ASSESSMENT — COLUMBIA-SUICIDE SEVERITY RATING SCALE - C-SSRS
2. HAVE YOU ACTUALLY HAD ANY THOUGHTS OF KILLING YOURSELF?: NO
1. IN THE PAST MONTH, HAVE YOU WISHED YOU WERE DEAD OR WISHED YOU COULD GO TO SLEEP AND NOT WAKE UP?: NO
6. HAVE YOU EVER DONE ANYTHING, STARTED TO DO ANYTHING, OR PREPARED TO DO ANYTHING TO END YOUR LIFE?: NO

## 2024-09-10 NOTE — PATIENT INSTRUCTIONS
Thank you so much for visiting us today.    We are going to continue all your medications for now.    Will schedule you for a left heart catheterization at [facility] within the next 7 days, please call our office at [phone] if you have any question or you do not hear from us by tomorrow.

## 2024-09-10 NOTE — PROGRESS NOTES
Subjective   Maci Terry is a 69 y.o. female PMH of hypothyroidism, HLD, vitamin D deficiency, arthritis, and recent admission at [facility] for acute onset diastolic heart failure [ProBNP 4080, troponin flat around 0.23 and 0.051].  Patient was admitted to [facility] before [facility] Day with few days of shortness of breath, cough, and lower extremity edema.  She feels better since discharge, 80% back to baseline, however she still reports PERES with climbing up 1 flight of stairs etc.  She denies any orthopnea, PND, chest pressure, palpitation, or dizziness.  Her blood pressure is 126/77, heart rate 77. Patient is on aspirin 81, Jardiance 10 mg, Lasix 20 mg daily, losartan 25 mg, metoprolol succinate 25 mg, and rosuvastatin 20 mg.    TTE 9/3/2024:  Exam indication: Initial evaluation of Heart Failure   - The left ventricle is normal in size. There is no left ventricular hypertrophy.  Left ventricular systolic function is normal. EF = 57 ± 5% (2D biplane) Grade I left ventricular diastolic dysfunction.   - The right ventricle is normal in size. Right ventricular systolic function is normal.   - There is no significant valvular abnormalities   - There is no regional wall motion abnormalities.   - Estimated right ventricular systolic pressure is 15 mmHg consistent with normal pulmonary artery pressures. Estimated right atrial pressure is 3 mmHg based on IVC  assessment.   - The patient has not had a prior  echocardiographic exam for comparison.     Nuclear stress test on 9/3/2024:   1. SPECT Perfusion Study: Abnormal.    2. There is mild (<10%) ischemia in the territory of the LAD (apex/apical anterior/anterolateral).    3. No evidence of scarred myocardium.    4. Left ventricle is normal in size. The left ventricle systolic function is normal.    5. This is an intermediate risk scan.     Review of Systems  ROS is negative other than in HPI.      Objective   Physical Exam  General: Obese, NAD  HEENT: IEOM,  PERRL   Neck: No JVD or carotid bruit  Lungs: CTAB  Heart: RRR, normal S1 and S2, no loud murmurs  Abdomen: Soft, nontender, positive bowel sounds  Extremities: No edema  Neurologic: No FND  Psychiatric: Normal mood and affect    Assessment/Plan   1-new onset diastolic heart failure/ HFpEF:  -See HPI for details.  -Patient appears euvolemic on exam.  -Continue current medications including Jardiance and Lasix 20 m once daily, blood pressure control.  -Ischemic evaluation as below.     2-abnormal stress test:  -Patient had nuclear stress test on 9/3/2024 which showed mild (<10%) ischemia in the territory of the LAD (apex/apical anterior/anterolateral).   -Patient reported PERES, denies any chest pain.  -Continue aspirin 81 mg, atorvastatin milligram, and metoprolol.  -Plan for left heart catheterization at [facility] in the next 7 days.    Jose Collins MD

## 2024-09-11 ENCOUNTER — HOSPITAL ENCOUNTER (OUTPATIENT)
Facility: HOSPITAL | Age: 69
Setting detail: OUTPATIENT SURGERY
Discharge: HOME | End: 2024-09-11
Attending: HOSPITALIST | Admitting: HOSPITALIST
Payer: MEDICARE

## 2024-09-11 VITALS
OXYGEN SATURATION: 97 % | DIASTOLIC BLOOD PRESSURE: 46 MMHG | HEART RATE: 72 BPM | RESPIRATION RATE: 18 BRPM | TEMPERATURE: 97.3 F | SYSTOLIC BLOOD PRESSURE: 135 MMHG

## 2024-09-11 DIAGNOSIS — R94.39 ABNORMAL STRESS TEST: Primary | ICD-10-CM

## 2024-09-11 DIAGNOSIS — R93.1 ABNORMAL FINDINGS ON DIAGNOSTIC IMAGING OF HEART AND CORONARY CIRCULATION: ICD-10-CM

## 2024-09-11 LAB — ACT BLD: 216 SEC (ref 83–199)

## 2024-09-11 PROCEDURE — 99152 MOD SED SAME PHYS/QHP 5/>YRS: CPT | Performed by: HOSPITALIST

## 2024-09-11 PROCEDURE — 93454 CORONARY ARTERY ANGIO S&I: CPT | Performed by: HOSPITALIST

## 2024-09-11 PROCEDURE — 2550000001 HC RX 255 CONTRASTS: Performed by: HOSPITALIST

## 2024-09-11 PROCEDURE — 2500000004 HC RX 250 GENERAL PHARMACY W/ HCPCS (ALT 636 FOR OP/ED): Performed by: NURSE PRACTITIONER

## 2024-09-11 PROCEDURE — 7100000002 HC RECOVERY ROOM TIME - EACH INCREMENTAL 1 MINUTE: Performed by: HOSPITALIST

## 2024-09-11 PROCEDURE — 96373 THER/PROPH/DIAG INJ IA: CPT | Performed by: HOSPITALIST

## 2024-09-11 PROCEDURE — 93458 L HRT ARTERY/VENTRICLE ANGIO: CPT | Performed by: HOSPITALIST

## 2024-09-11 PROCEDURE — 99153 MOD SED SAME PHYS/QHP EA: CPT | Performed by: HOSPITALIST

## 2024-09-11 PROCEDURE — 2500000004 HC RX 250 GENERAL PHARMACY W/ HCPCS (ALT 636 FOR OP/ED): Performed by: HOSPITALIST

## 2024-09-11 PROCEDURE — C1894 INTRO/SHEATH, NON-LASER: HCPCS | Performed by: HOSPITALIST

## 2024-09-11 PROCEDURE — C1769 GUIDE WIRE: HCPCS | Performed by: HOSPITALIST

## 2024-09-11 PROCEDURE — 7100000009 HC PHASE TWO TIME - INITIAL BASE CHARGE: Performed by: HOSPITALIST

## 2024-09-11 PROCEDURE — 2500000005 HC RX 250 GENERAL PHARMACY W/O HCPCS: Performed by: HOSPITALIST

## 2024-09-11 PROCEDURE — 85347 COAGULATION TIME ACTIVATED: CPT

## 2024-09-11 PROCEDURE — 7100000010 HC PHASE TWO TIME - EACH INCREMENTAL 1 MINUTE: Performed by: HOSPITALIST

## 2024-09-11 PROCEDURE — C1760 CLOSURE DEV, VASC: HCPCS | Performed by: HOSPITALIST

## 2024-09-11 PROCEDURE — 99223 1ST HOSP IP/OBS HIGH 75: CPT | Performed by: NURSE PRACTITIONER

## 2024-09-11 PROCEDURE — 7100000001 HC RECOVERY ROOM TIME - INITIAL BASE CHARGE: Performed by: HOSPITALIST

## 2024-09-11 PROCEDURE — 2720000007 HC OR 272 NO HCPCS: Performed by: HOSPITALIST

## 2024-09-11 RX ORDER — ACETAMINOPHEN 650 MG/1
650 SUPPOSITORY RECTAL EVERY 6 HOURS PRN
Status: DISCONTINUED | OUTPATIENT
Start: 2024-09-11 | End: 2024-09-11 | Stop reason: HOSPADM

## 2024-09-11 RX ORDER — HEPARIN SODIUM 1000 [USP'U]/ML
INJECTION, SOLUTION INTRAVENOUS; SUBCUTANEOUS AS NEEDED
Status: DISCONTINUED | OUTPATIENT
Start: 2024-09-11 | End: 2024-09-11 | Stop reason: HOSPADM

## 2024-09-11 RX ORDER — NAPROXEN SODIUM 220 MG/1
81 TABLET, FILM COATED ORAL ONCE
Status: DISCONTINUED | OUTPATIENT
Start: 2024-09-11 | End: 2024-09-11 | Stop reason: HOSPADM

## 2024-09-11 RX ORDER — FENTANYL CITRATE 50 UG/ML
INJECTION, SOLUTION INTRAMUSCULAR; INTRAVENOUS AS NEEDED
Status: DISCONTINUED | OUTPATIENT
Start: 2024-09-11 | End: 2024-09-11 | Stop reason: HOSPADM

## 2024-09-11 RX ORDER — VERAPAMIL HYDROCHLORIDE 2.5 MG/ML
INJECTION, SOLUTION INTRAVENOUS AS NEEDED
Status: DISCONTINUED | OUTPATIENT
Start: 2024-09-11 | End: 2024-09-11 | Stop reason: HOSPADM

## 2024-09-11 RX ORDER — ACETAMINOPHEN 325 MG/1
650 TABLET ORAL EVERY 6 HOURS PRN
Status: DISCONTINUED | OUTPATIENT
Start: 2024-09-11 | End: 2024-09-11 | Stop reason: HOSPADM

## 2024-09-11 RX ORDER — ACETAMINOPHEN 160 MG/5ML
650 SOLUTION ORAL EVERY 6 HOURS PRN
Status: DISCONTINUED | OUTPATIENT
Start: 2024-09-11 | End: 2024-09-11 | Stop reason: HOSPADM

## 2024-09-11 RX ORDER — MIDAZOLAM HYDROCHLORIDE 1 MG/ML
INJECTION, SOLUTION INTRAMUSCULAR; INTRAVENOUS AS NEEDED
Status: DISCONTINUED | OUTPATIENT
Start: 2024-09-11 | End: 2024-09-11 | Stop reason: HOSPADM

## 2024-09-11 RX ORDER — SODIUM CHLORIDE 9 MG/ML
75 INJECTION, SOLUTION INTRAVENOUS CONTINUOUS
Status: DISCONTINUED | OUTPATIENT
Start: 2024-09-11 | End: 2024-09-11

## 2024-09-11 RX ORDER — NITROGLYCERIN 40 MG/100ML
INJECTION INTRAVENOUS AS NEEDED
Status: DISCONTINUED | OUTPATIENT
Start: 2024-09-11 | End: 2024-09-11 | Stop reason: HOSPADM

## 2024-09-11 RX ORDER — SODIUM CHLORIDE 9 MG/ML
150 INJECTION, SOLUTION INTRAVENOUS CONTINUOUS
Status: ACTIVE | OUTPATIENT
Start: 2024-09-11 | End: 2024-09-11

## 2024-09-11 RX ORDER — LIDOCAINE HYDROCHLORIDE 20 MG/ML
INJECTION, SOLUTION INFILTRATION; PERINEURAL AS NEEDED
Status: DISCONTINUED | OUTPATIENT
Start: 2024-09-11 | End: 2024-09-11 | Stop reason: HOSPADM

## 2024-09-11 ASSESSMENT — PAIN SCALES - GENERAL
PAINLEVEL_OUTOF10: 0 - NO PAIN
PAINLEVEL_OUTOF10: 2
PAINLEVEL_OUTOF10: 0 - NO PAIN

## 2024-09-11 ASSESSMENT — ENCOUNTER SYMPTOMS
EYES NEGATIVE: 1
PSYCHIATRIC NEGATIVE: 1
HEMATOLOGIC/LYMPHATIC NEGATIVE: 1
CARDIOVASCULAR NEGATIVE: 1
GASTROINTESTINAL NEGATIVE: 1
NEUROLOGICAL NEGATIVE: 1
SHORTNESS OF BREATH: 1
FATIGUE: 1
MUSCULOSKELETAL NEGATIVE: 1
ALLERGIC/IMMUNOLOGIC NEGATIVE: 1
ENDOCRINE NEGATIVE: 1

## 2024-09-11 NOTE — H&P
History Of Present Illness  Maci Terry is a 69 y.o. female presenting with new onset diastolic heart failure/HFpEF, abnormal stress test, here for [facility]. PMH includes hypothyroidism, HLD, vitamin D deficiency, arthritis, and recent admission at [facility] for acute onset diastolic heart failure [ProBNP 4080, troponin flat around 0.23 and 0.051]     Past Medical History:  Past Medical History:   Diagnosis Date    Arthritis     Incontinence in female     Insomnia     Pes anserinus bursitis of right knee     POP-Q stage 2 rectocele     Spinal headache     Vaginal vault prolapse after hysterectomy     Vitamin D deficiency         Past Surgical History:  Past Surgical History:   Procedure Laterality Date    CHOLECYSTECTOMY      CYSTOCELE REPAIR      1970s    HYSTERECTOMY      with prolapse repair    KNEE CARTILAGE SURGERY Right 07/2023    OTHER SURGICAL HISTORY  2008    bladder and rectal prolapse repair    SACROCOLPOPEXY  03/29/2024    robotic assisted sacrocolpopexy; posterior repair, cystoscopy    TUBAL LIGATION            Social History:   reports that she has never smoked. She has never been exposed to tobacco smoke. She has never used smokeless tobacco. She reports that she does not currently use alcohol. She reports that she does not use drugs.     Family History:  Family History   Problem Relation Name Age of Onset    No Known Problems Mother      No Known Problems Father      No Known Problems Sister      No Known Problems Brother          Allergies:  Allergies   Allergen Reactions    Doxycycline Hives        Home Medications:  Current Outpatient Medications   Medication Instructions    acetaminophen (TYLENOL) 650 mg, oral, Every 6 hours PRN    albuterol (Ventolin HFA) 90 mcg/actuation inhaler 2 puffs, inhalation, Every 4 hours PRN    aspirin 81 mg, oral, Daily RT    dextromethorphan polistirex ER (DELSYM) 60 mg, oral, 2 times daily    empagliflozin (JARDIANCE) 10 mg, oral, Daily RT    estrogens (conjugated) (PREMARIN)  0.45 mg, oral, Daily    furosemide (LASIX) 20 mg, oral, Daily    ibuprofen 600 mg, oral, Every 6 hours    losartan (COZAAR) 25 mg, oral, Daily RT    metoprolol succinate XL (TOPROL-XL) 25 mg, oral, Daily RT    multivitamin tablet 1 tablet, oral, Daily    oxyCODONE (ROXICODONE) 5 mg, oral, Every 6 hours PRN    polyethylene glycol (GLYCOLAX, MIRALAX) 17 g, oral, Daily    rosuvastatin (CRESTOR) 20 mg, oral, Daily RT       Inpatient Medications:  Scheduled medications   Medication Dose Route Frequency    aspirin  81 mg oral Once     PRN medications   Medication     Continuous Medications   Medication Dose Last Rate    sodium chloride 0.9%  100 mL/hr           Review of Systems   Constitutional:  Positive for fatigue.   HENT: Negative.     Eyes: Negative.    Respiratory:  Positive for shortness of breath (on exertion).    Cardiovascular: Negative.    Gastrointestinal: Negative.    Endocrine: Negative.    Genitourinary: Negative.    Musculoskeletal: Negative.    Skin: Negative.    Allergic/Immunologic: Negative.    Neurological: Negative.    Hematological: Negative.    Psychiatric/Behavioral: Negative.            Physical Exam  Constitutional:       General: She is awake. She is not in acute distress.     Appearance: She is not ill-appearing.   Cardiovascular:      Rate and Rhythm: Normal rate and regular rhythm.      Pulses: Normal pulses.           Radial pulses are 2+ on the right side and 2+ on the left side.        Dorsalis pedis pulses are 2+ on the right side and 2+ on the left side.      Heart sounds: Normal heart sounds. No murmur heard.  Pulmonary:      Effort: Pulmonary effort is normal.      Breath sounds: Normal breath sounds and air entry.   Abdominal:      General: Bowel sounds are normal.      Palpations: Abdomen is soft.      Tenderness: There is no abdominal tenderness.   Musculoskeletal:      Right lower le+ Edema present.      Left lower le+ Edema present.   Skin:     General: Skin is warm and  "dry.   Neurological:      General: No focal deficit present.      Mental Status: She is alert and oriented to person, place, and time.      GCS: GCS eye subscore is 4. GCS verbal subscore is 5. GCS motor subscore is 6.   Psychiatric:         Mood and Affect: Mood normal.         Behavior: Behavior is cooperative.        Sedation Plan    ASA 3     Mallampati class: III.           NPO since 0730 (at which time she had one cracker with her medications)- Dr. Collins aware    Last Recorded Vitals  Blood pressure 160/70, pulse 69, resp. rate 16, SpO2 95%.         Vitals from the Past 24 Hours  Heart Rate:  [69-77]   Resp:  [16]   BP: (126-160)/(70-77)   Height:  [160 cm (5' 3\")]   Weight:  [84.3 kg (185 lb 14.4 oz)]   SpO2:  [94 %-95 %]          Relevant Results    Labs    CBC:   Recent Labs     08/30/24  1521 03/15/24  0947 07/13/23  1610 10/12/22  1041 03/11/20  1200   WBC 6.3 4.8 5.5 5.0 4.6   HGB 11.8* 12.7 12.7 12.7 13.1   HCT 37.5 40.2 38.4 40.1 41.1    339 311 284 301   MCV 88 89 87.5 90 90     BMP/CMP:   Recent Labs     08/30/24  1521 03/15/24  0947 07/13/23  1610 10/12/22  1041 03/11/20  1200    141 141 138 139   K 3.7 4.7 4.4 4.4 4.3    103 106 105 105   BUN 13 18 11 18 11   CREATININE 0.67 0.63 0.8 0.66 0.55   CO2 29 28 24 28 27   CALCIUM 8.8 8.9 8.6 9.2 9.1   PROT 6.3* 6.7  --  7.0 6.5   BILITOT 0.7 0.8  --  1.1 1.2   ALKPHOS 100 94  --  97 87   ALT 12 10  --  29 9   AST 13 14  --  28 14   GLUCOSE 88 88 101* 81 85      Lipid Panel:   Recent Labs     10/12/22  1041 03/11/20  1200   CHOL 164 167   HDL 67.7 66.8   CHHDL 2.4 2.5   VLDL 18 15   TRIG 88 74     Cardiac       No lab exists for component: \"CK\", \"CKMBP\"   Hemoglobin A1C:   Recent Labs     09/01/24  0500 10/20/22  1011 03/11/20  1200   HGBA1C 5.4 5.2 5.3     TSH/ Free T4:   Recent Labs     03/15/24  0947 10/12/22  1041 03/11/20  1200   TSH 2.60 2.41 2.37     Iron:   Recent Labs     08/30/24  1521   *     Coag:     ABO: No results " "found for: \"ABO\"    Past Cardiology Tests (Last 3 Years):    EKG:  Recent Labs     08/30/24  1443   ATRRATE 71   VENTRATE 71   PRINT 190   QRSDUR 86   QTCFRED 450   QTCCALCB 462     Encounter Date: 08/30/24   ECG 12 lead (Ancillary Performed)   Result Value    Ventricular Rate 71    Atrial Rate 71    [place] Interval 190    QRS Duration 86    QT Interval 426    QTC Calculation(Bazett) 462    P Axis 67    R Axis 78    T Axis 253    QRS Count 11    Q Onset 213    P Onset 118    P Offset 179    T Offset 426    QTC Fredericia 450    Narrative    Normal sinus rhythm  T wave abnormality, consider inferior ischemia  T wave abnormality, consider anterolateral ischemia  Abnormal ECG  No previous ECGs available     Echo:  Echocardiogram: No results found for this or any previous visit from the past 1800 days.    Ejection Fractions:  No results found for: \"EF\"  Cath:  Coronary Angiography: No results found for this or any previous visit from the past 1800 days.    Right Heart Cath: No results found for this or any previous visit from the past 1800 days.    Stress Test:  Nuclear:No results found for this or any previous visit from the past 1800 days.    Metabolic Stress: No results found for this or any previous visit from the past 1800 days.    Cardiac Imaging:  Cardiac Scoring: No results found for this or any previous visit from the past 1800 days.    Cardiac MRI: No results found for this or any previous visit from the past 1800 days.         Assessment/Plan  Assessment/Plan   Principal Problem:    Abnormal stress test        new onset diastolic heart failure/HFpEF, abnormal stress test, -C with Dr. Collins on 9/11/24  -asa prior to procedure       NP discussed with Dr. Collins regarding plan of care/ discharge plan      I spent 30 minutes in the professional and overall care of this patient.      Kwadwo Butler, APRN-CNP, DNP    "

## 2024-09-11 NOTE — POST-PROCEDURE NOTE
Physician Transition of Care Summary  Invasive Cardiovascular Lab    Procedure Date: 9/11/2024  Attending:    Sadia Collins - Primary  Resident/Fellow/Other Assistant: Surgeons and Role:  * No surgeons found with a matching role *    Indications:   Pre-op Diagnosis      * Abnormal stress test [R94.39]    Post-procedure diagnosis:   Post-op Diagnosis     * Abnormal stress test [R94.39]    Procedure(s):   Left Heart Cath with Coronary Angiography no   11219 - IN CATH PLMT L HRT & ARTS W/NJX & ANGIO IMG S&I    Procedure Findings:   Mild CAD [30-40% mid LAD] in a right-dominant system.    Access of the Procedure:   6 [place] right radial artery, closed with TR band.    Complications:   None.    Stents/Implants:   None.    Anticoagulation/Antiplatelet Plan:   Heparin bolus for radial access.    Estimated Blood Loss:   10 mL    Anesthesia: Moderate Sedation Anesthesia Staff: No anesthesia staff entered.    Any Specimen(s) Removed:   No specimens collected during this procedure.    Disposition:   -TR band removal and IV hydration per protocol.  -Follow-up in the clinic in 4 weeks.      Electronically signed by: Jose Collins MD, 9/11/2024 3:13 PM

## 2024-09-12 ENCOUNTER — TELEPHONE (OUTPATIENT)
Dept: CARDIOLOGY | Facility: CLINIC | Age: 69
End: 2024-09-12
Payer: MEDICARE

## 2024-09-12 NOTE — TELEPHONE ENCOUNTER
Patient was called LM to schedule 6wk FUV with Dr. Collins provided direct call back number for scheduling.

## 2024-09-13 DIAGNOSIS — N95.1 POSTMENOPAUSAL DISORDER: ICD-10-CM

## 2024-09-13 LAB
ATRIAL RATE: 71 BPM
P AXIS: 67 DEGREES
P OFFSET: 179 MS
P ONSET: 118 MS
PR INTERVAL: 190 MS
Q ONSET: 213 MS
QRS COUNT: 11 BEATS
QRS DURATION: 86 MS
QT INTERVAL: 426 MS
QTC CALCULATION(BAZETT): 462 MS
QTC FREDERICIA: 450 MS
R AXIS: 78 DEGREES
T AXIS: 253 DEGREES
T OFFSET: 426 MS
VENTRICULAR RATE: 71 BPM

## 2024-09-18 ENCOUNTER — PATIENT OUTREACH (OUTPATIENT)
Dept: PRIMARY CARE | Facility: CLINIC | Age: 69
End: 2024-09-18
Payer: MEDICARE

## 2024-09-18 NOTE — PROGRESS NOTES
Call one week early. Pt. Doing well. Did request to switch appt. Advised pt. That she has had 3 hospital admissions since her last appointment and advised. To keep scheduled appointment.   Maria M Silver LPN

## 2024-09-18 NOTE — PROGRESS NOTES
"Subjective   Patient ID: Maci Terry is a 69 y.o. female who presents for Hospital Follow-up (TCM; she is still winded and having SOB at times; had a heart cath done on the 11th, seeing Candelaria tomorrow;).    HPI     Maci Terry is a 69 y.o. female presenting today for follow-up after being discharged from the hospital  16  days ago. The main problem requiring admission was SOB HYPOKALEMIA. The discharge summary and/or TransitionalCare Management documentation was reviewed. Medication reconciliation was performed as indicated via the \"Danyel as Reviewed\" timestamp.    Maci Terry was contacted by Transitional Care Management services two days after her discharge. This encounter and supporting documentation was reviewed.       Dx with new onset Diastolic HF (HFpEF) had abnormal stress test.   She is following with Dr. Collins (Cardiology with [facility])  Had left heart cath on 9/11/24.     She was having swollen feet, cough, SOB. Came in on 8/30/24. She talked to ortho about the swelling in her feet. She was told she might be in CHF. She did blood work and chest xray, EKG. She had elevated Troponin at 29, and her BNP was 635. She went to ER and was admitted. Dr. Aponte. She had an Echo stress test, this was abnormal. They wanted to send her to [facility], she wanted to do it as outpatient. She was then set up with Dr. Collins in [place]. Had the cath done, had 30% occlusion.     She was having some increased belly circumference. She is retaining more fluid than usually, this is better with lasix. She lost about 8lbs of fluids. Would like to talk to cardiology about this tomorrow. May need to increase her water pill.     She is still getting SOB at times.       All other systems reviewed and negative for complaint unless stated above.      Review of Systems    Objective   /82 (BP Location: Right arm, Patient Position: Sitting, BP Cuff Size: Adult)   Pulse 65   Wt 84 kg (185 lb 3.2 oz)   BMI 32.81 kg/m²     Physical " Exam  Constitutional:       Appearance: Normal appearance.   Cardiovascular:      Rate and Rhythm: Normal rate and regular rhythm.   Pulmonary:      Effort: Pulmonary effort is normal.      Breath sounds: Normal breath sounds.   Skin:     General: Skin is warm and dry.   Neurological:      Mental Status: She is alert and oriented to person, place, and time. Mental status is at baseline.   Psychiatric:         Mood and Affect: Mood normal.         Behavior: Behavior normal.         Assessment/Plan       #Shortness of breath  #CHF  #HFU  Overall doing better  Still having some SOB at times   Discussed increasing lasix   Would like to wait and talk to Cardiology at appt. Tomorrow       #B/L LE edema  Continue  lasix      #Knee pain   Following with a PA but looking into establishing with new ortho     #Incontinence  Following with Dr. Morales  Scheduled for bladder lift on 3/29  Thinks maybe it did work   But occasional incontinent   She is emptying this.  Has not followed up      #Hot flashes  Doing well on premarin     #Dyslipidemia   Diet controlled     #Hypothyroidism  doing well off of meds     #Insomnia   Ambien stopped working for her  She had fogginess on the trazodone     #Vitamin D   On replacement     HCM:  UTD for flu and PNA vaccines  C-scope 2022 with Madyson, next due 2032  Mammogram Dec negative

## 2024-09-23 ENCOUNTER — APPOINTMENT (OUTPATIENT)
Dept: PRIMARY CARE | Facility: CLINIC | Age: 69
End: 2024-09-23
Payer: MEDICARE

## 2024-09-23 VITALS
WEIGHT: 185.2 LBS | HEART RATE: 65 BPM | DIASTOLIC BLOOD PRESSURE: 82 MMHG | BODY MASS INDEX: 32.81 KG/M2 | SYSTOLIC BLOOD PRESSURE: 134 MMHG

## 2024-09-23 DIAGNOSIS — Z09 HOSPITAL DISCHARGE FOLLOW-UP: Primary | ICD-10-CM

## 2024-09-23 DIAGNOSIS — R94.39 ABNORMAL STRESS TEST: ICD-10-CM

## 2024-09-23 DIAGNOSIS — I50.30 DIASTOLIC CONGESTIVE HEART FAILURE, UNSPECIFIED HF CHRONICITY: ICD-10-CM

## 2024-09-23 PROCEDURE — 1123F ACP DISCUSS/DSCN MKR DOCD: CPT | Performed by: REGISTERED NURSE

## 2024-09-23 PROCEDURE — 1036F TOBACCO NON-USER: CPT | Performed by: REGISTERED NURSE

## 2024-09-23 PROCEDURE — 99214 OFFICE O/P EST MOD 30 MIN: CPT | Performed by: REGISTERED NURSE

## 2024-09-23 PROCEDURE — 1159F MED LIST DOCD IN RCRD: CPT | Performed by: REGISTERED NURSE

## 2024-09-23 PROCEDURE — 1160F RVW MEDS BY RX/DR IN RCRD: CPT | Performed by: REGISTERED NURSE

## 2024-09-24 ENCOUNTER — PATIENT OUTREACH (OUTPATIENT)
Dept: PRIMARY CARE | Facility: CLINIC | Age: 69
End: 2024-09-24

## 2024-09-24 ENCOUNTER — APPOINTMENT (OUTPATIENT)
Dept: CARDIOLOGY | Facility: CLINIC | Age: 69
End: 2024-09-24
Payer: MEDICARE

## 2024-09-24 ENCOUNTER — OFFICE VISIT (OUTPATIENT)
Dept: CARDIOLOGY | Facility: CLINIC | Age: 69
End: 2024-09-24
Payer: MEDICARE

## 2024-09-24 VITALS
OXYGEN SATURATION: 97 % | SYSTOLIC BLOOD PRESSURE: 134 MMHG | WEIGHT: 186.1 LBS | DIASTOLIC BLOOD PRESSURE: 83 MMHG | HEART RATE: 70 BPM | HEIGHT: 63 IN | BODY MASS INDEX: 32.97 KG/M2

## 2024-09-24 DIAGNOSIS — G47.33 OSA (OBSTRUCTIVE SLEEP APNEA): ICD-10-CM

## 2024-09-24 DIAGNOSIS — I11.9 BENIGN HYPERTENSIVE HEART DISEASE WITHOUT CONGESTIVE HEART FAILURE: ICD-10-CM

## 2024-09-24 DIAGNOSIS — R06.09 DOE (DYSPNEA ON EXERTION): ICD-10-CM

## 2024-09-24 DIAGNOSIS — I50.30 HEART FAILURE WITH PRESERVED EJECTION FRACTION, UNSPECIFIED HF CHRONICITY: Primary | ICD-10-CM

## 2024-09-24 DIAGNOSIS — R53.83 OTHER FATIGUE: ICD-10-CM

## 2024-09-24 PROCEDURE — 1123F ACP DISCUSS/DSCN MKR DOCD: CPT | Performed by: HOSPITALIST

## 2024-09-24 PROCEDURE — 1159F MED LIST DOCD IN RCRD: CPT | Performed by: HOSPITALIST

## 2024-09-24 PROCEDURE — 99215 OFFICE O/P EST HI 40 MIN: CPT | Performed by: HOSPITALIST

## 2024-09-24 PROCEDURE — 3008F BODY MASS INDEX DOCD: CPT | Performed by: HOSPITALIST

## 2024-09-24 RX ORDER — LOSARTAN POTASSIUM 50 MG/1
50 TABLET ORAL DAILY
Qty: 30 TABLET | Refills: 11 | Status: SHIPPED | OUTPATIENT
Start: 2024-09-24 | End: 2025-09-24

## 2024-09-24 ASSESSMENT — COLUMBIA-SUICIDE SEVERITY RATING SCALE - C-SSRS
1. IN THE PAST MONTH, HAVE YOU WISHED YOU WERE DEAD OR WISHED YOU COULD GO TO SLEEP AND NOT WAKE UP?: NO
2. HAVE YOU ACTUALLY HAD ANY THOUGHTS OF KILLING YOURSELF?: NO
6. HAVE YOU EVER DONE ANYTHING, STARTED TO DO ANYTHING, OR PREPARED TO DO ANYTHING TO END YOUR LIFE?: NO

## 2024-09-24 NOTE — PATIENT INSTRUCTIONS
Thank you so much for visiting us today.    You do not have evidence of volume overload and your exam, and therefore there is no indication to increase your Lasix dose.    For your fatigue, this could be caused by sleep apnea +/- metoprolol.    Please stop your metoprolol.    We are going to refer you for sleep study.    We will increase your losartan from 25 to 50 mg once daily to better control blood pressure.    Please continue all your other medications.    Please do your best to exercise a regular basis, please add aerobic cardio exercises, start at 5 to 10 minutes a day, ideally 30 minutes 5 times a week.    Please do your best to lose weight through diet and exercise.  Look up Mediterranean diet.  Skip breakfast if you can,  a window of 10 hours to eat a day [10 AM to 6 PM for example], do not eat after 6 PM at night.  Avoid food rich with sugar and carbohydrates like bread, pasta, rice, and potatoes.  Count your calories and try to be 500-calorie deficient a day, this way would lose 1 pound a week [3500 josselyn equals to 1 pound].    Feel free to call us at [phone], will be happy to see you once a year or he can follow-up with your local cardiologist.

## 2024-09-24 NOTE — PROGRESS NOTES
Subjective   Maci Terry is a 69 y.o. female with PMH of hypothyroidism, HLD, vitamin D deficiency, arthritis, and recent admission at [facility] for acute onset diastolic heart failure [ProBNP 4080, troponin flat around 0.23 and 0.051].  Patient was admitted to [facility] before Labor Day with few days of shortness of breath, cough, and lower extremity edema.  She feels better since discharge, 80% back to baseline, however she still reports PERES with climbing up 1 flight of stairs etc. patient complains of abdominal bloating and cramps, in addition to nausea and belching.  She admits that she had gained 20 pounds the last 1 year.  Her weight has been stable since last visit.  Her lower extremity feels better after wearing compression stocking.  She denies any orthopnea, PND, chest pressure, palpitation, or dizziness.  Patient reports fatigue, needing to take naps during the day, snoring at night.  Her blood pressure is 134/83, average at home SBP of 130-150. Patient is on aspirin 81, Jardiance 10 mg, Lasix 20 mg daily, losartan 25 mg, metoprolol succinate 25 mg, and rosuvastatin 20 mg.     TTE 9/3/2024:  Exam indication: Initial evaluation of Heart Failure   - The left ventricle is normal in size. There is no left ventricular hypertrophy.  Left ventricular systolic function is normal. EF = 57 ± 5% (2D biplane) Grade I left ventricular diastolic dysfunction.   - The right ventricle is normal in size. Right ventricular systolic function is normal.   - There is no significant valvular abnormalities   - There is no regional wall motion abnormalities.   - Estimated right ventricular systolic pressure is 15 mmHg consistent with normal pulmonary artery pressures. Estimated right atrial pressure is 3 mmHg based on IVC  assessment.   - The patient has not had a prior  echocardiographic exam for comparison.      Nuclear stress test on 9/3/2024:   1. SPECT Perfusion Study: Abnormal.    2. There is mild (<10%)  ischemia in the territory of the LAD (apex/apical anterior/anterolateral).    3. No evidence of scarred myocardium.    4. Left ventricle is normal in size. The left ventricle systolic function is normal.    5. This is an intermediate risk scan.     [facility] 9/11/2024:  Mild CAD [30-40% mid LAD] in a right_dominant system.     Review of Systems  ROS is negative other than in HPI.      Objective   Physical Exam  General: Obese, NAD  HEENT: IEOM, PERRL   Neck: No JVD or carotid bruit  Lungs: CTAB  Heart: RRR, normal S1 and S2, no loud murmurs  Abdomen: Soft, nontender, positive bowel sounds  Extremities: Nonpitting edema  Neurologic: No FND  Psychiatric: Normal mood and affect    Assessment/Plan   1-new onset diastolic heart failure/ HFpEF:  -See HPI for details.  Nonischemic, only mild CAD on her coronary angiogram from 9/11/2024.  -Patient appears euvolemic on exam.  -Continue current medications including Jardiance and Lasix 20 m once daily, blood pressure control.      2-mild CAD:  -Patient had nuclear stress test on 9/3/2024 which showed mild (<10%) ischemia in the territory of the LAD (apex/apical anterior/anterolateral).   -Only mild CAD on her coronary angiogram from 9/11/2024.  -No chest pain.  -Continue aspirin 81 mg and atorvastatin.    3-suspected JULIO CESAR:  -See HPI for details, will order sleep study.  -Will stop metoprolol due to fatigue, latter could also be secondary to JULIO CESAR.    4-PERES:  -Suspect it is multifactorial likely secondary to body habitus and deconditioning.  -No evidence of heart failure exam.  -Patient was advised to lose weight through healthy diet and exercise.    5-HTN:  -Suboptimally controlled, increase losartan from 25 to 50 mg once a day.    I spent total of 60 minutes in direct patient care.    RTC in 1 year.    Jose Collins MD

## 2024-09-24 NOTE — PROGRESS NOTES
Call regarding appt. with PCP on (  9-23-24 ) after hospitalization.  At time of outreach call the patient feels as if their condition has improved since last visit.  Reviewed the PCP appointment with the pt and addressed any questions or concerns.    Pt. States no questions/concerns at this time.   Maria M Silver LPN

## 2024-10-04 ENCOUNTER — PATIENT OUTREACH (OUTPATIENT)
Dept: PRIMARY CARE | Facility: CLINIC | Age: 69
End: 2024-10-04
Payer: MEDICARE

## 2024-10-04 NOTE — PROGRESS NOTES
Successful outreach to patient regarding hospitalization as patient continues TCM program.   At time of outreach call the patient feels as if their condition has  since initial visit with PCP or specialist.    Pt. Saw Cardiology, she had some medication changes, is going to have a sleep study and going to have a PFT.   Ordered Prescriptions    Prescription Sig Dispensed Refills Start Date End Date   furosemide (LASIX) 40 mg tablet   Indications: New onset of congestive heart failure (HCC) Take 1 tablet by mouth once daily. 30 tablet  2 10/02/2024 12/31/2024   magnesium glycinate 100 mg magnesium capsule   Indications: Muscle cramps Take 2 capsules by mouth once daily. 60 capsule  11 10/02/2024 10/02/2025   sacubitril-valsartan (ENTRESTO) 24-26 mg tablet  Take 0.5 tablets by mouth two times a day. 30 tablet  11 10/02/2024 10/02/2025   She states she is going to have repeat blood work done on Monday.     Pt. Nemesio she is still adjusting to medication changes. She is still a little SOB at times but will take it easy.     She is up and able to help her sisters on this date.     Maria M Silver LPN

## 2024-10-15 ENCOUNTER — APPOINTMENT (OUTPATIENT)
Dept: CARDIOLOGY | Facility: CLINIC | Age: 69
End: 2024-10-15
Payer: MEDICARE

## 2024-10-20 DIAGNOSIS — R06.02 SHORTNESS OF BREATH: ICD-10-CM

## 2024-10-21 RX ORDER — FUROSEMIDE 20 MG/1
20 TABLET ORAL DAILY
Qty: 90 TABLET | Refills: 4 | Status: SHIPPED | OUTPATIENT
Start: 2024-10-21

## 2024-12-04 ENCOUNTER — PATIENT OUTREACH (OUTPATIENT)
Dept: PRIMARY CARE | Facility: CLINIC | Age: 69
End: 2024-12-04
Payer: MEDICARE

## 2025-01-06 NOTE — PROGRESS NOTES
Subjective   Patient ID: Maci Terry is a 69 y.o. female who presents for Follow-up (Discuss lab results; Dr Fadi has been switching her meds around, took her off of Losartan and adjusted entresol; ).    She is going to "[employer] with her sister Aiyana.    Heart failure: Following with cardiologist. She is currently taking a lower dose entresto, still on lasix, and jardiance. S/p left heart cath. She is going to cardiac rehabd and she is going to water aerobics in the [facility]. Trying not to nap. She is getting her energy back since scaling back on the dose. They are doing weight watchers ([employer]), she is very much struggling with the weight loss. She is working on this, concerned that she is 40lb overweight. She has significantly reduced sodium intake. Eating more fruits and vegetables. More chicken and beef. No swelling. Shortness of breath has improved significantly. She does get occasional dry cough. Dealing with some fatigue.      Recurrent UTI: Following with urogyn. Planning for bladder lift on 3/29.      Right knee pain: Getting gucosamine injections with Teri Blake but considering getting replacements done within the next year.      Feet tingling, numbness: Going on for 6 or 8 months. Mostly on the soles of her feet. No pain. She does have a sister with neuropathy.      Hypothyroidism: off meds for about 4 months, completed labs last month.      Insomnia: Ambien stopped working. Hung over on trazodone. Doing ok without meds at this point.      All other systems reviewed and negative for complaint unless stated above.    Objective   /75 (BP Location: Right arm, Patient Position: Sitting, BP Cuff Size: Adult)   Pulse 62   Wt 81.3 kg (179 lb 3.2 oz)   BMI 31.74 kg/m²     Gen: No acute distress, alert and oriented x3, pleasant   HEENT: moist mucous membranes, b/l external auditory canals are clear of debris, TMs within normal limits, no oropharyngeal lesions, eomi, perrla   Neck: thyroid within normal  limits, no lymphadenopathy   CV: RRR, normal S1/S2, no murmur   Resp: Clear to auscultation bilaterally, no wheezes or rhonchi appreciated  Abd: soft, nontender, non-distended, no guarding/rigidity, bowel sounds present  Extr: no edema, no calf tenderness  Derm: Skin is warm and dry, no rashes appreciated  Psych: mood is good, affect is congruent, good hygiene, normal speech and eye contact  Neuro: cranial nerves grossly intact, normal gait    Assessment/Plan   #Fatigue  Consider wellbutrin  Check labs  Leaning toward treatment of low thyroid    #Heart Failure  Currently on lasix, jardiance, entresto  Exercising and doing    #JULIO CESAR:  Work on weight loss  She needs a titration study  Requesting records from sleep lab     #Knee pain   Following with a PA but looking into establishing with new ortho     #Incontinence  Following with Dr. Morales  Doing ok since bladder lift     #Hot flashes  Doing well on premarin     #Dyslipidemia   Now on low dose rosuvastatin     #Hypothyroidism  doing well off of meds     #Insomnia   Ambien stopped working for her  She had fogginess on the trazodone     #Vitamin D   On replacement     HCM:  UTD for flu and PNA vaccines  C-scope 2022 with Madyson, next due 2032  Mammogram Dec negative   Statement Selected

## 2025-01-14 ENCOUNTER — APPOINTMENT (OUTPATIENT)
Dept: PRIMARY CARE | Facility: CLINIC | Age: 70
End: 2025-01-14
Payer: MEDICARE

## 2025-01-14 VITALS
DIASTOLIC BLOOD PRESSURE: 75 MMHG | WEIGHT: 179.2 LBS | BODY MASS INDEX: 31.74 KG/M2 | HEART RATE: 62 BPM | SYSTOLIC BLOOD PRESSURE: 127 MMHG

## 2025-01-14 DIAGNOSIS — E78.5 DYSLIPIDEMIA: ICD-10-CM

## 2025-01-14 DIAGNOSIS — E03.9 HYPOTHYROIDISM, UNSPECIFIED TYPE: Primary | ICD-10-CM

## 2025-01-14 DIAGNOSIS — R53.83 OTHER FATIGUE: ICD-10-CM

## 2025-01-14 DIAGNOSIS — Z12.31 ENCOUNTER FOR SCREENING MAMMOGRAM FOR MALIGNANT NEOPLASM OF BREAST: ICD-10-CM

## 2025-01-14 DIAGNOSIS — D64.9 ANEMIA, UNSPECIFIED TYPE: ICD-10-CM

## 2025-01-14 PROCEDURE — 99214 OFFICE O/P EST MOD 30 MIN: CPT | Performed by: FAMILY MEDICINE

## 2025-01-14 PROCEDURE — 1159F MED LIST DOCD IN RCRD: CPT | Performed by: FAMILY MEDICINE

## 2025-01-14 PROCEDURE — 1123F ACP DISCUSS/DSCN MKR DOCD: CPT | Performed by: FAMILY MEDICINE

## 2025-01-14 PROCEDURE — 1036F TOBACCO NON-USER: CPT | Performed by: FAMILY MEDICINE

## 2025-01-14 RX ORDER — SACUBITRIL AND VALSARTAN 24; 26 MG/1; MG/1
TABLET, FILM COATED ORAL
COMMUNITY
Start: 2025-01-11

## 2025-01-14 RX ORDER — MAGNESIUM GLYCINATE 100 MG
2 CAPSULE ORAL
COMMUNITY
Start: 2024-10-27

## 2025-01-14 RX ORDER — EMPAGLIFLOZIN 10 MG/1
10 TABLET, FILM COATED ORAL DAILY
COMMUNITY

## 2025-01-14 RX ORDER — ROSUVASTATIN CALCIUM 20 MG/1
20 TABLET, COATED ORAL DAILY
COMMUNITY

## 2025-01-14 NOTE — PATIENT INSTRUCTIONS
Radiology:  [place]:  [phone]     Central Scheduling:  Radiology: [phone]      [facility] [phone]

## 2025-01-17 ENCOUNTER — LAB (OUTPATIENT)
Dept: LAB | Facility: LAB | Age: 70
End: 2025-01-17
Payer: MEDICARE

## 2025-01-17 DIAGNOSIS — D64.9 ANEMIA, UNSPECIFIED TYPE: ICD-10-CM

## 2025-01-17 DIAGNOSIS — E03.9 HYPOTHYROIDISM, UNSPECIFIED TYPE: ICD-10-CM

## 2025-01-17 DIAGNOSIS — R53.83 OTHER FATIGUE: ICD-10-CM

## 2025-01-17 LAB
ALBUMIN SERPL BCP-MCNC: 4.1 G/DL (ref 3.4–5)
ALP SERPL-CCNC: 89 U/L (ref 33–136)
ALT SERPL W P-5'-P-CCNC: 10 U/L (ref 7–45)
ANION GAP SERPL CALC-SCNC: 16 MMOL/L (ref 10–20)
AST SERPL W P-5'-P-CCNC: 15 U/L (ref 9–39)
BASOPHILS # BLD AUTO: 0.03 X10*3/UL (ref 0–0.1)
BASOPHILS NFR BLD AUTO: 0.6 %
BILIRUB SERPL-MCNC: 1 MG/DL (ref 0–1.2)
BUN SERPL-MCNC: 15 MG/DL (ref 6–23)
CALCIUM SERPL-MCNC: 8.8 MG/DL (ref 8.6–10.3)
CHLORIDE SERPL-SCNC: 100 MMOL/L (ref 98–107)
CO2 SERPL-SCNC: 28 MMOL/L (ref 21–32)
CREAT SERPL-MCNC: 0.88 MG/DL (ref 0.5–1.05)
EGFRCR SERPLBLD CKD-EPI 2021: 72 ML/MIN/1.73M*2
EOSINOPHIL # BLD AUTO: 0.06 X10*3/UL (ref 0–0.7)
EOSINOPHIL NFR BLD AUTO: 1.3 %
ERYTHROCYTE [DISTWIDTH] IN BLOOD BY AUTOMATED COUNT: 13.3 % (ref 11.5–14.5)
GLUCOSE SERPL-MCNC: 95 MG/DL (ref 74–99)
HCT VFR BLD AUTO: 41.4 % (ref 36–46)
HGB BLD-MCNC: 12.9 G/DL (ref 12–16)
IMM GRANULOCYTES # BLD AUTO: 0.01 X10*3/UL (ref 0–0.7)
IMM GRANULOCYTES NFR BLD AUTO: 0.2 % (ref 0–0.9)
IRON SERPL-MCNC: 72 UG/DL (ref 35–150)
LYMPHOCYTES # BLD AUTO: 1.22 X10*3/UL (ref 1.2–4.8)
LYMPHOCYTES NFR BLD AUTO: 26.1 %
MCH RBC QN AUTO: 28.9 PG (ref 26–34)
MCHC RBC AUTO-ENTMCNC: 31.2 G/DL (ref 32–36)
MCV RBC AUTO: 93 FL (ref 80–100)
MONOCYTES # BLD AUTO: 0.44 X10*3/UL (ref 0.1–1)
MONOCYTES NFR BLD AUTO: 9.4 %
NEUTROPHILS # BLD AUTO: 2.92 X10*3/UL (ref 1.2–7.7)
NEUTROPHILS NFR BLD AUTO: 62.4 %
NRBC BLD-RTO: 0 /100 WBCS (ref 0–0)
PLATELET # BLD AUTO: 293 X10*3/UL (ref 150–450)
POTASSIUM SERPL-SCNC: 3.6 MMOL/L (ref 3.5–5.3)
PROT SERPL-MCNC: 6.7 G/DL (ref 6.4–8.2)
RBC # BLD AUTO: 4.46 X10*6/UL (ref 4–5.2)
SODIUM SERPL-SCNC: 140 MMOL/L (ref 136–145)
TSH SERPL-ACNC: 2.51 MIU/L (ref 0.44–3.98)
WBC # BLD AUTO: 4.7 X10*3/UL (ref 4.4–11.3)

## 2025-01-17 PROCEDURE — 85025 COMPLETE CBC W/AUTO DIFF WBC: CPT

## 2025-01-17 PROCEDURE — 80053 COMPREHEN METABOLIC PANEL: CPT

## 2025-01-17 PROCEDURE — 82607 VITAMIN B-12: CPT

## 2025-01-17 PROCEDURE — 83540 ASSAY OF IRON: CPT

## 2025-01-17 PROCEDURE — 84443 ASSAY THYROID STIM HORMONE: CPT

## 2025-01-18 LAB — VIT B12 SERPL-MCNC: 573 PG/ML (ref 211–911)

## 2025-01-22 ENCOUNTER — HOSPITAL ENCOUNTER (OUTPATIENT)
Dept: RADIOLOGY | Facility: HOSPITAL | Age: 70
Discharge: HOME | End: 2025-01-22
Payer: MEDICARE

## 2025-01-22 VITALS — WEIGHT: 175 LBS | BODY MASS INDEX: 31.01 KG/M2 | HEIGHT: 63 IN

## 2025-01-22 DIAGNOSIS — Z12.31 ENCOUNTER FOR SCREENING MAMMOGRAM FOR MALIGNANT NEOPLASM OF BREAST: ICD-10-CM

## 2025-01-22 PROCEDURE — 77063 BREAST TOMOSYNTHESIS BI: CPT | Performed by: RADIOLOGY

## 2025-01-22 PROCEDURE — 77067 SCR MAMMO BI INCL CAD: CPT

## 2025-01-22 PROCEDURE — 77067 SCR MAMMO BI INCL CAD: CPT | Performed by: RADIOLOGY

## 2025-02-17 ENCOUNTER — OFFICE VISIT (OUTPATIENT)
Dept: PRIMARY CARE | Facility: CLINIC | Age: 70
End: 2025-02-17
Payer: MEDICARE

## 2025-02-17 VITALS
BODY MASS INDEX: 31.54 KG/M2 | SYSTOLIC BLOOD PRESSURE: 126 MMHG | WEIGHT: 178 LBS | DIASTOLIC BLOOD PRESSURE: 78 MMHG | HEART RATE: 102 BPM | HEIGHT: 63 IN | TEMPERATURE: 99.7 F

## 2025-02-17 DIAGNOSIS — J06.9 UPPER RESPIRATORY TRACT INFECTION, UNSPECIFIED TYPE: ICD-10-CM

## 2025-02-17 LAB
POC RAPID INFLUENZA A: NEGATIVE
POC RAPID INFLUENZA B: NEGATIVE
POC SARS-COV-2 AG BINAX: NORMAL

## 2025-02-17 PROCEDURE — 99214 OFFICE O/P EST MOD 30 MIN: CPT | Performed by: FAMILY MEDICINE

## 2025-02-17 PROCEDURE — 1123F ACP DISCUSS/DSCN MKR DOCD: CPT | Performed by: FAMILY MEDICINE

## 2025-02-17 PROCEDURE — 87811 SARS-COV-2 COVID19 W/OPTIC: CPT | Performed by: FAMILY MEDICINE

## 2025-02-17 PROCEDURE — 3008F BODY MASS INDEX DOCD: CPT | Performed by: FAMILY MEDICINE

## 2025-02-17 PROCEDURE — 87804 INFLUENZA ASSAY W/OPTIC: CPT | Performed by: FAMILY MEDICINE

## 2025-02-17 RX ORDER — AMOXICILLIN AND CLAVULANATE POTASSIUM 875; 125 MG/1; MG/1
875 TABLET, FILM COATED ORAL 2 TIMES DAILY
Qty: 20 TABLET | Refills: 0 | Status: SHIPPED | OUTPATIENT
Start: 2025-02-17 | End: 2025-02-27

## 2025-02-17 RX ORDER — BENZONATATE 100 MG/1
100 CAPSULE ORAL 3 TIMES DAILY PRN
Qty: 42 CAPSULE | Refills: 0 | Status: SHIPPED | OUTPATIENT
Start: 2025-02-17 | End: 2025-03-19

## 2025-02-17 RX ORDER — ALBUTEROL SULFATE 0.83 MG/ML
2.5 SOLUTION RESPIRATORY (INHALATION) 4 TIMES DAILY PRN
Qty: 75 ML | Refills: 1 | Status: SHIPPED | OUTPATIENT
Start: 2025-02-17 | End: 2025-03-19

## 2025-02-17 NOTE — PROGRESS NOTES
"Subjective   Patient ID: Maci Terry is a 69 y.o. female who presents for Sick Visit (Cough, congestion, PND, fever, headache, runny nose, ear fullness started Sunday- sister is Symone Barba, took supplements and promethazine DM).    URI: She had a massage on Thursday from her sick sister. Sx began yesterday. Fever (100.7), body aches, chills, HA, cough, chest congestion, PND. Not bringing anything up. She has taken promethazine and some vitamins. No vomiting or diarrhea. Mcihelle's  is sick as well.     Objective   /78 (BP Location: Left arm, Patient Position: Sitting, BP Cuff Size: Adult)   Pulse 102   Temp 37.6 °C (99.7 °F) (Skin)   Ht 1.6 m (5' 3\")   Wt 80.7 kg (178 lb)   BMI 31.53 kg/m²     Gen: No acute distress, alert and oriented x3, pleasant   HEENT: moist mucous membranes, b/l external auditory canals are clear of debris, TMs within normal limits, no oropharyngeal lesions, eomi, perrla   Neck: thyroid within normal limits, no lymphadenopathy   CV: RRR, normal S1/S2, no murmur   Resp: Clear to auscultation bilaterally, no wheezes or rhonchi appreciated  Abd: soft, nontender, non-distended, no guarding/rigidity, bowel sounds present  Extr: no edema, no calf tenderness  Derm: Skin is warm and dry, no rashes appreciated  Psych: mood is good, affect is congruent, good hygiene, normal speech and eye contact  Neuro: cranial nerves grossly intact, normal gait    Assessment/Plan     #Acute bronchitis  Rx sent augmentin, discussed probiotics  Fluids, rest, tylenol prn  Rx sent nebulizer liquid  Rx sent tessalon perles  "

## 2025-03-04 DIAGNOSIS — R06.02 SHORTNESS OF BREATH: ICD-10-CM

## 2025-03-04 RX ORDER — ALBUTEROL SULFATE 90 UG/1
2 INHALANT RESPIRATORY (INHALATION) EVERY 4 HOURS PRN
Qty: 8.5 G | Refills: 1 | Status: SHIPPED | OUTPATIENT
Start: 2025-03-04 | End: 2026-03-04

## 2025-04-04 NOTE — PROGRESS NOTES
Subjective   Patient ID: Maci Terry is a 69 y.o. female who presents for No chief complaint on file..    She is going to [place] with her sister Aiyana.     Heart failure: Following with cardiologist. She is currently taking a lower dose entresto, still on lasix, and jardiance. S/p left heart cath. She is going to cardiac rehabd and she is going to water aerobics in the [facility]. Trying not to nap. She is getting her energy back since scaling back on the dose. They are doing weight watchers ([employer]), she is very much struggling with the weight loss. She is working on this, concerned that she is 40lb overweight. She has significantly reduced sodium intake. Eating more fruits and vegetables. More chicken and beef. No swelling. Shortness of breath has improved significantly. She does get occasional dry cough. Dealing with some fatigue.      Recurrent UTI: Following with urogyn. Planning for bladder lift on 3/29.      Right knee pain: Getting gucosamine injections with Teri Blake but considering getting replacements done within the next year.      Feet tingling, numbness: Going on for 6 or 8 months. Mostly on the soles of her feet. No pain. She does have a sister with neuropathy.      Hypothyroidism: off meds for about 4 months, completed labs last month.      Insomnia: Ambien stopped working. Hung over on trazodone. Doing ok without meds at this point.      All other systems reviewed and negative for complaint unless stated above.    Objective   There were no vitals taken for this visit.    Gen: No acute distress, alert and oriented x3, pleasant   HEENT: moist mucous membranes, b/l external auditory canals are clear of debris, TMs within normal limits, no oropharyngeal lesions, eomi, perrla   Neck: thyroid within normal limits, no lymphadenopathy   CV: RRR, normal S1/S2, no murmur   Resp: Clear to auscultation bilaterally, no wheezes or rhonchi appreciated  Abd: soft, nontender, non-distended, no guarding/rigidity, bowel  sounds present  Extr: no edema, no calf tenderness  Derm: Skin is warm and dry, no rashes appreciated  Psych: mood is good, affect is congruent, good hygiene, normal speech and eye contact  Neuro: cranial nerves grossly intact, normal gait    No data recorded        Assessment/Plan   #Fatigue  Consider wellbutrin  Check labs  Leaning toward treatment of low thyroid     #Heart Failure  Currently on lasix, jardiance, entresto  Exercising and doing     #JULIO CESAR:  Work on weight loss  She needs a titration study  Requesting records from sleep lab     #Knee pain   Following with a PA but looking into establishing with new ortho     #Incontinence  Following with Dr. Morales  Doing ok since bladder lift     #Hot flashes  Doing well on premarin     #Dyslipidemia   Now on low dose rosuvastatin     #Hypothyroidism  doing well off of meds     #Insomnia   Ambien stopped working for her  She had fogginess on the trazodone     #Vitamin D   On replacement     HCM:  UTD for flu and PNA vaccines  C-scope 2022 with Madyson, next due 2032  Mammogram Shubham negative

## 2025-04-15 ENCOUNTER — OFFICE VISIT (OUTPATIENT)
Dept: PRIMARY CARE | Facility: CLINIC | Age: 70
End: 2025-04-15
Payer: MEDICARE

## 2025-04-15 ENCOUNTER — APPOINTMENT (OUTPATIENT)
Dept: PRIMARY CARE | Facility: CLINIC | Age: 70
End: 2025-04-15
Payer: MEDICARE

## 2025-04-15 VITALS
HEIGHT: 63 IN | BODY MASS INDEX: 31.86 KG/M2 | SYSTOLIC BLOOD PRESSURE: 124 MMHG | HEART RATE: 73 BPM | WEIGHT: 179.8 LBS | DIASTOLIC BLOOD PRESSURE: 81 MMHG

## 2025-04-15 DIAGNOSIS — R06.02 SHORTNESS OF BREATH: ICD-10-CM

## 2025-04-15 DIAGNOSIS — R05.8 COUGH PRODUCTIVE OF PURULENT SPUTUM: Primary | ICD-10-CM

## 2025-04-15 DIAGNOSIS — R05.3 CHRONIC COUGH: ICD-10-CM

## 2025-04-15 PROCEDURE — 99213 OFFICE O/P EST LOW 20 MIN: CPT

## 2025-04-15 PROCEDURE — 1123F ACP DISCUSS/DSCN MKR DOCD: CPT

## 2025-04-15 PROCEDURE — 1159F MED LIST DOCD IN RCRD: CPT

## 2025-04-15 PROCEDURE — 3008F BODY MASS INDEX DOCD: CPT

## 2025-04-15 RX ORDER — ALBUTEROL SULFATE 90 UG/1
2 INHALANT RESPIRATORY (INHALATION) EVERY 4 HOURS PRN
Qty: 8 G | Refills: 1 | Status: SHIPPED | OUTPATIENT
Start: 2025-04-15 | End: 2026-04-15

## 2025-04-15 RX ORDER — ASPIRIN 81 MG/1
81 TABLET ORAL DAILY
COMMUNITY

## 2025-04-15 RX ORDER — IPRATROPIUM BROMIDE AND ALBUTEROL SULFATE 2.5; .5 MG/3ML; MG/3ML
3 SOLUTION RESPIRATORY (INHALATION) 4 TIMES DAILY PRN
Qty: 90 ML | Refills: 0 | Status: SHIPPED | OUTPATIENT
Start: 2025-04-15 | End: 2025-06-14

## 2025-04-15 NOTE — PROGRESS NOTES
Subjective   Patient ID: Maci Terry is a 69 y.o. female who presents for Follow-up (3 month follow up. She is still dealing with a cough and was tested for flu/rsv/covid and everything was negative. She was down and sick in bed for 10 weeks. This is the first week that she is feeling better, although she is still very sick. Was put on zpak and prednisone 3 weeks ago from pul. And even that was not helping. ).    HPI   Maci is here for a 3 month follow up.    Complaints of on going cough. SOB upon exertion,used inhaler did not help. Has been treated twice with abx and steroids,  had CXR's which were negative. She did see pulmonologist recently. States that when she coughs her head vibrates, she is producing yellow/green mucus, she had a fever and a headache. She and her sisters went to   [place] for two weeks- Came back 3/21/25. States she was in bed the whole time, she felt miserable. She is finally starting to feel better.   In the past she had been exposed to:   Second hand smoke.   [place]- 2 years took a year off then came back.    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                Desert dust- quartz particles??Non occupational silicosis? Waste burn pits?               [phone] and [id number]     Following with pul. Dr. Washington    Heart failure: Following with cardiologist. She is currently taking a lower dose entresto, still on lasix, and jardiance. S/p left heart cath. She is going to cardiac rehabd and she is going to water aerobics in the [facility]. Trying not to nap. She is getting her energy back since scaling back on the dose. They are doing weight watchers ([employer]), she is very much struggling with the weight loss. She is working on this, concerned that she is 40lb overweight. She has significantly reduced sodium intake. Eating more fruits and vegetables. More chicken and beef. No swelling. Shortness of breath has improved significantly. She does get occasional dry cough. Dealing with some  "fatigue.      Recurrent UTI: Following with urogyn. Planning for bladder lift on 3/29.      Right knee pain: Getting gucosamine injections with Teri Blake but considering getting replacements done within the next year.      Feet tingling, numbness: Going on for 6 or 8 months. Mostly on the soles of her feet. No pain. She does have a sister with neuropathy.      Hypothyroidism: off meds for about 4 months, completed labs last month.      Insomnia: Ambien stopped working. Hung over on trazodone. Doing ok without meds at this point.      All other systems reviewed and negative for complaint unless stated above.    Review of Systems      Objective   /81 (BP Location: Left arm)   Pulse 73   Ht 1.6 m (5' 3\")   Wt 81.6 kg (179 lb 12.8 oz)   BMI 31.85 kg/m²     Physical Exam  Gen: No acute distress, alert and oriented x3, pleasant   HEENT: moist mucous membranes, b/l external auditory canals are clear of debris, TMs within normal limits, no oropharyngeal lesions, eomi, perrla   Neck: thyroid within normal limits, no lymphadenopathy   CV: RRR, normal S1/S2, no murmur   Resp: wheezes Left upper lobe  Abd: soft, nontender, non-distended, no guarding/rigidity, bowel sounds present  Extr: no edema, no calf tenderness  Derm: Skin is warm and dry, no rashes appreciated  Psych: mood is good, affect is congruent, good hygiene, normal speech and eye contact  Neuro: cranial nerves grossly intact, normal gait    Assessment/Plan     #Chronic Cough  Recurrent respiratory infection  DuoNebs ordered  Following with pulm  If no improvement will order CT  In the [employer] traveled all over   [id number] and [id number] [facility]    --Desert dust- quartz particles??Non occupational silicosis? Waste burn pits?    #Fatigue  Consider wellbutrin  Check labs  Leaning toward treatment of low thyroid     #Heart Failure  Currently on lasix, jardiance, entresto  Exercising and doing     #JULIO CESAR:  Work on weight loss  She needs a titration " study  Requesting records from sleep lab     #Knee pain   Following with a PA but looking into establishing with new ortho     #Incontinence  Following with Dr. Morales  Doing ok since bladder lift     #Hot flashes  Doing well on premarin     #Dyslipidemia   Now on low dose rosuvastatin     #Hypothyroidism  doing well off of meds     #Insomnia   Ambien stopped working for her  She had fogginess on the trazodone     #Vitamin D   On replacement     HCM:  UTD for flu and PNA vaccines  C-scope 2022 with Madyson, next due 2032  Mammogram Dec negative

## (undated) DEVICE — GLOVE, SURGICAL, PROTEXIS PI BLUE W/NEUTHERA, 7.0, PF, LF

## (undated) DEVICE — COUNTER, NEEDLE, FOAM BLOCK, POP-N-COUNT, W/BLADEGUARD, W/ADHESIVE 40 COUNT, RED

## (undated) DEVICE — TIP, SUCTION, YANKAUER, BULB, ADULT

## (undated) DEVICE — GUIDEWIRE, HI-TORQUE, VERSACORE, 260CM, FLOPPY

## (undated) DEVICE — TROCAR, OPTICAL, BLADELESS, 12MM, THREADED, 100MM LENGTH

## (undated) DEVICE — SEAL, UNIVERSAL 5-8MM  XI

## (undated) DEVICE — CATHETER, ANGIO, IMPULSE, FL3.5, 5 FR X 100 CM

## (undated) DEVICE — SUTURE, PDS II, 0, 27 IN, CT-2, VIOLET

## (undated) DEVICE — NEEDLE, SPINAL, 22 G X 3.5 IN, BLACK HUB

## (undated) DEVICE — SUTURE, GORE-TEX CV-2 THX-26 36 DA"

## (undated) DEVICE — GLOVE, SURGICAL, PROTEXIS PI MICRO, 7.0, PF, LF

## (undated) DEVICE — CARE KIT, LAPAROSCOPIC, ADVANCED

## (undated) DEVICE — IRRIGATION SET, CYSTOSCOPY, REGULATING CLAMP, STRAIGHT, 81 IN

## (undated) DEVICE — CATHETER, ANGIO, IMPULSE, FR4, 5 FR X 100 CM

## (undated) DEVICE — SUTURE, CTD, VICRYL, 2-0, UND, BR, CT-2

## (undated) DEVICE — TROCAR, OPTICAL BLADELESS 5MM X 100 W/ADVANCED FIXATION

## (undated) DEVICE — DRAPE, COLUMN, DAVINCI XI

## (undated) DEVICE — TRAY, FOLEY, LUBRI-SIL, 16FR, COMPLETE CARE W/STATLOCK

## (undated) DEVICE — GUIDEWIRE, INQWIRE, 3MM J, .035, 260

## (undated) DEVICE — NEEDLE, INSUFFLATION, 13GAX120MM, DISP

## (undated) DEVICE — SPONGE, LAP, XRAY DECT, 18IN X 18IN, W/MASTER DMT, STERILE

## (undated) DEVICE — TR BAND, RADIAL COMPRESSION, STANDARD, 24CM

## (undated) DEVICE — ACCESS SYS, KII SHIELDED BLADED, Z-THREAD, 12X100CM

## (undated) DEVICE — SUTURE, VICRYL, 0, 27 IN, UR-6, VIOLET

## (undated) DEVICE — PUMP, STRYKERFLOW 2 & HANDPIECE W/10FT. IRRIGATION TUBING

## (undated) DEVICE — ADHESIVE, SKIN, LIQUIBAND EXCEED

## (undated) DEVICE — TOWEL, SURGICAL, NEURO, O/R, 16 X 26, BLUE, STERILE

## (undated) DEVICE — DRAPE PACK, LAVH, W/ATTACHED LEGGINGS, W/POUCH, 100 X 114 IN, LF, STERILE

## (undated) DEVICE — OBTURATOR, BLADELESS , SU

## (undated) DEVICE — SUTURE, PDS II, 2-0, 27 IN, CT2, VIOLET

## (undated) DEVICE — DRAPE, ARM XI

## (undated) DEVICE — SHEATH, GLIDESHEATH, SLENDER, 6FR 10CM

## (undated) DEVICE — COVER, TIP HOT SHEARS ENDOWRIST

## (undated) DEVICE — SHEAR, W/UNIPOLAR CAUTERY, ENDOSHEAR, 5 MM

## (undated) DEVICE — TUBE SET, PNEUMOLAR HEATED, SMOKE EVACU, HIGH-FLOW